# Patient Record
Sex: FEMALE | Race: WHITE | Employment: FULL TIME | URBAN - METROPOLITAN AREA
[De-identification: names, ages, dates, MRNs, and addresses within clinical notes are randomized per-mention and may not be internally consistent; named-entity substitution may affect disease eponyms.]

---

## 2017-01-19 ENCOUNTER — OFFICE VISIT (OUTPATIENT)
Dept: RHEUMATOLOGY | Age: 47
End: 2017-01-19

## 2017-01-19 VITALS
TEMPERATURE: 98 F | SYSTOLIC BLOOD PRESSURE: 100 MMHG | BODY MASS INDEX: 36.65 KG/M2 | HEIGHT: 65 IN | WEIGHT: 220 LBS | DIASTOLIC BLOOD PRESSURE: 70 MMHG

## 2017-01-19 DIAGNOSIS — M79.10 MYALGIA: Primary | ICD-10-CM

## 2017-01-19 PROCEDURE — 99244 OFF/OP CNSLTJ NEW/EST MOD 40: CPT | Performed by: INTERNAL MEDICINE

## 2017-02-14 ENCOUNTER — OFFICE VISIT (OUTPATIENT)
Dept: INTERNAL MEDICINE CLINIC | Age: 47
End: 2017-02-14

## 2017-02-14 VITALS — DIASTOLIC BLOOD PRESSURE: 60 MMHG | SYSTOLIC BLOOD PRESSURE: 100 MMHG | HEART RATE: 80 BPM

## 2017-02-14 DIAGNOSIS — M25.551 BILATERAL HIP PAIN: Primary | ICD-10-CM

## 2017-02-14 DIAGNOSIS — M25.552 BILATERAL HIP PAIN: Primary | ICD-10-CM

## 2017-02-14 DIAGNOSIS — M79.2 NEUROPATHIC PAIN, ARM: ICD-10-CM

## 2017-02-14 DIAGNOSIS — E66.01 SEVERE OBESITY (BMI 35.0-39.9) WITH COMORBIDITY (HCC): ICD-10-CM

## 2017-02-14 PROCEDURE — 99214 OFFICE O/P EST MOD 30 MIN: CPT | Performed by: INTERNAL MEDICINE

## 2017-02-14 RX ORDER — PREGABALIN 50 MG/1
50 CAPSULE ORAL 3 TIMES DAILY
Qty: 90 CAPSULE | Refills: 3 | Status: SHIPPED | OUTPATIENT
Start: 2017-02-14 | End: 2017-09-05 | Stop reason: CLARIF

## 2017-02-21 ENCOUNTER — OFFICE VISIT (OUTPATIENT)
Dept: ORTHOPEDIC SURGERY | Age: 47
End: 2017-02-21

## 2017-02-21 VITALS
HEART RATE: 81 BPM | WEIGHT: 220 LBS | DIASTOLIC BLOOD PRESSURE: 69 MMHG | SYSTOLIC BLOOD PRESSURE: 98 MMHG | BODY MASS INDEX: 36.65 KG/M2 | HEIGHT: 65 IN

## 2017-02-21 DIAGNOSIS — M67.952 TENDINOPATHY OF LEFT GLUTEUS MEDIUS: ICD-10-CM

## 2017-02-21 DIAGNOSIS — M25.551 BILATERAL HIP PAIN: Primary | ICD-10-CM

## 2017-02-21 DIAGNOSIS — M67.951 TENDINOPATHY OF RIGHT GLUTEUS MEDIUS: ICD-10-CM

## 2017-02-21 DIAGNOSIS — M70.62 TROCHANTERIC BURSITIS OF LEFT HIP: ICD-10-CM

## 2017-02-21 DIAGNOSIS — M70.61 TROCHANTERIC BURSITIS OF RIGHT HIP: ICD-10-CM

## 2017-02-21 DIAGNOSIS — M25.552 BILATERAL HIP PAIN: Primary | ICD-10-CM

## 2017-02-21 PROCEDURE — 99243 OFF/OP CNSLTJ NEW/EST LOW 30: CPT | Performed by: ORTHOPAEDIC SURGERY

## 2017-02-21 PROCEDURE — 20610 DRAIN/INJ JOINT/BURSA W/O US: CPT | Performed by: ORTHOPAEDIC SURGERY

## 2017-02-21 PROCEDURE — 73523 X-RAY EXAM HIPS BI 5/> VIEWS: CPT | Performed by: ORTHOPAEDIC SURGERY

## 2017-03-28 DIAGNOSIS — M79.10 MYALGIA: ICD-10-CM

## 2017-03-28 RX ORDER — TIZANIDINE HYDROCHLORIDE 2 MG/1
CAPSULE, GELATIN COATED ORAL
Qty: 30 CAPSULE | Refills: 1 | Status: SHIPPED | OUTPATIENT
Start: 2017-03-28 | End: 2017-06-11 | Stop reason: SDUPTHER

## 2017-04-11 ENCOUNTER — OFFICE VISIT (OUTPATIENT)
Dept: ORTHOPEDIC SURGERY | Age: 47
End: 2017-04-11

## 2017-04-11 VITALS
HEIGHT: 66 IN | DIASTOLIC BLOOD PRESSURE: 71 MMHG | WEIGHT: 220 LBS | HEART RATE: 74 BPM | SYSTOLIC BLOOD PRESSURE: 107 MMHG | BODY MASS INDEX: 35.36 KG/M2

## 2017-04-11 DIAGNOSIS — M70.61 TROCHANTERIC BURSITIS OF RIGHT HIP: ICD-10-CM

## 2017-04-11 DIAGNOSIS — M67.952 TENDINOPATHY OF LEFT GLUTEUS MEDIUS: Primary | ICD-10-CM

## 2017-04-11 DIAGNOSIS — M70.62 TROCHANTERIC BURSITIS, LEFT HIP: ICD-10-CM

## 2017-04-11 DIAGNOSIS — M67.951 TENDINOPATHY OF RIGHT GLUTEUS MEDIUS: ICD-10-CM

## 2017-04-11 PROCEDURE — 99213 OFFICE O/P EST LOW 20 MIN: CPT | Performed by: ORTHOPAEDIC SURGERY

## 2017-06-11 DIAGNOSIS — M79.10 MYALGIA: ICD-10-CM

## 2017-06-12 RX ORDER — TIZANIDINE HYDROCHLORIDE 2 MG/1
CAPSULE, GELATIN COATED ORAL
Qty: 30 CAPSULE | Refills: 1 | Status: SHIPPED | OUTPATIENT
Start: 2017-06-12 | End: 2017-11-14 | Stop reason: SDUPTHER

## 2017-09-05 ENCOUNTER — OFFICE VISIT (OUTPATIENT)
Dept: INTERNAL MEDICINE CLINIC | Age: 47
End: 2017-09-05

## 2017-09-05 VITALS
DIASTOLIC BLOOD PRESSURE: 66 MMHG | BODY MASS INDEX: 36.65 KG/M2 | WEIGHT: 220 LBS | OXYGEN SATURATION: 96 % | HEIGHT: 65 IN | SYSTOLIC BLOOD PRESSURE: 114 MMHG | HEART RATE: 76 BPM | TEMPERATURE: 97.7 F

## 2017-09-05 DIAGNOSIS — E66.01 SEVERE OBESITY (BMI 35.0-39.9) WITH COMORBIDITY (HCC): ICD-10-CM

## 2017-09-05 DIAGNOSIS — M20.5X1 HALLUX LIMITUS OF RIGHT FOOT: Primary | ICD-10-CM

## 2017-09-05 PROCEDURE — 99242 OFF/OP CONSLTJ NEW/EST SF 20: CPT | Performed by: INTERNAL MEDICINE

## 2017-09-05 ASSESSMENT — PATIENT HEALTH QUESTIONNAIRE - PHQ9
1. LITTLE INTEREST OR PLEASURE IN DOING THINGS: 0
2. FEELING DOWN, DEPRESSED OR HOPELESS: 0
SUM OF ALL RESPONSES TO PHQ9 QUESTIONS 1 & 2: 0
SUM OF ALL RESPONSES TO PHQ QUESTIONS 1-9: 0

## 2017-09-05 ASSESSMENT — ENCOUNTER SYMPTOMS
RESPIRATORY NEGATIVE: 1
GASTROINTESTINAL NEGATIVE: 1
EYES NEGATIVE: 1

## 2017-10-19 RX ORDER — GABAPENTIN 600 MG/1
TABLET ORAL
Qty: 90 TABLET | Refills: 1 | Status: SHIPPED | OUTPATIENT
Start: 2017-10-19 | End: 2018-04-16 | Stop reason: SDUPTHER

## 2017-11-14 DIAGNOSIS — M79.10 MYALGIA: ICD-10-CM

## 2017-11-15 RX ORDER — TIZANIDINE HYDROCHLORIDE 2 MG/1
CAPSULE, GELATIN COATED ORAL
Qty: 30 CAPSULE | Refills: 1 | Status: SHIPPED | OUTPATIENT
Start: 2017-11-15 | End: 2018-02-07 | Stop reason: SDUPTHER

## 2017-11-16 ENCOUNTER — OFFICE VISIT (OUTPATIENT)
Dept: INTERNAL MEDICINE CLINIC | Age: 47
End: 2017-11-16

## 2017-11-16 VITALS
SYSTOLIC BLOOD PRESSURE: 104 MMHG | BODY MASS INDEX: 36.65 KG/M2 | HEART RATE: 71 BPM | OXYGEN SATURATION: 99 % | DIASTOLIC BLOOD PRESSURE: 60 MMHG | HEIGHT: 65 IN | WEIGHT: 220 LBS

## 2017-11-16 DIAGNOSIS — E66.01 SEVERE OBESITY (BMI 35.0-39.9) WITH COMORBIDITY (HCC): ICD-10-CM

## 2017-11-16 DIAGNOSIS — E78.00 PURE HYPERCHOLESTEROLEMIA: ICD-10-CM

## 2017-11-16 DIAGNOSIS — Z00.00 ROUTINE GENERAL MEDICAL EXAMINATION AT A HEALTH CARE FACILITY: Primary | ICD-10-CM

## 2017-11-16 PROCEDURE — 90686 IIV4 VACC NO PRSV 0.5 ML IM: CPT | Performed by: INTERNAL MEDICINE

## 2017-11-16 PROCEDURE — 99396 PREV VISIT EST AGE 40-64: CPT | Performed by: INTERNAL MEDICINE

## 2017-11-16 PROCEDURE — 90471 IMMUNIZATION ADMIN: CPT | Performed by: INTERNAL MEDICINE

## 2017-11-16 NOTE — PATIENT INSTRUCTIONS
Patient Education        Learning About the Mediterranean Diet  What is the [address]? The Mediterranean diet is a style of eating rather than a diet plan. It features foods eaten in [place], [place], [place] and [place], and other countries along the [facility]. It emphasizes eating foods like fish, fruits, vegetables, beans, high-fiber breads and whole grains, nuts, and olive oil. This style of eating includes limited red meat, cheese, and sweets. Why choose the Mediterranean diet? A Mediterranean-style diet may improve heart health. It contains more fat than other heart-healthy diets. But the fats are mainly from nuts, unsaturated oils (such as fish oils and olive oil), and certain nut or seed oils (such as canola, soybean, or flaxseed oil). These fats may help protect the heart and blood vessels. How can you get started on the Mediterranean diet? Here are some things you can do to switch to a more Mediterranean way of eating. What to eat  · Eat a variety of fruits and vegetables each day, such as grapes, blueberries, tomatoes, broccoli, peppers, figs, olives, spinach, eggplant, beans, lentils, and chickpeas. · Eat a variety of whole-grain foods each day, such as oats, brown rice, and whole wheat bread, pasta, and couscous. · Eat fish at least 2 times a week. Try tuna, salmon, mackerel, lake trout, herring, or sardines. · Eat moderate amounts of low-fat dairy products, such as milk, cheese, or yogurt. · Eat moderate amounts of poultry and eggs. · Choose healthy (unsaturated) fats, such as nuts, olive oil, and certain nut or seed oils like canola, soybean, and flaxseed. · Limit unhealthy (saturated) fats, such as butter, palm oil, and coconut oil. And limit fats found in animal products, such as meat and dairy products made with whole milk. Try to eat red meat only a few times a month in very small amounts. · Limit sweets and desserts to only a few times a week.  This includes a medical condition or this instruction, always ask your healthcare professional. [place] [address] any warranty or liability for your use of this information.

## 2017-11-16 NOTE — PROGRESS NOTES
History and Physical      Maria Esther Bertrand  YOB: 1970    Date of Service:  11/16/2017    Vitals:    11/16/17 1028   BP: 104/60   Pulse: 71   SpO2: 99%   Weight: 220 lb (99.8 kg)   Height: 5' 5\" (1.651 m)     Body mass index is 36.61 kg/m². Wt Readings from Last 3 Encounters:   11/16/17 220 lb (99.8 kg)   09/05/17 220 lb (99.8 kg)   04/11/17 220 lb (99.8 kg)     BP Readings from Last 3 Encounters:   11/16/17 104/60   09/05/17 114/66   04/11/17 107/71        Chief Complaint:   Maria Esther Bertrand is a 41 y.o. female who presents for complete physical examination. HPI: no new complaints    Patient Active Problem List   Diagnosis    Migraine headache    Bilateral hip pain    Neuropathic pain, arm    Neck pain    Lumbar spondylosis    Bilateral inguinal hernia    Fatigue    Insomnia    Severe obesity (BMI 35.0-39. 9) with comorbidity ([address]    Major depression    Multiple thyroid nodules    Paresthesia of left arm    Cervical spinal stenosis    Pure hypercholesterolemia    Hallux limitus of right foot       Allergies   Allergen Reactions    Amoxicillin-Pot Clavulanate Rash    Codeine Nausea And Vomiting     Severe vomiting     Outpatient Prescriptions Marked as Taking for the 11/16/17 encounter (Office Visit) with Michael Bhagat MD   Medication Sig Dispense Refill    tiZANidine (ZANAFLEX) 2 MG capsule TAKE 1 CAPSULE BY MOUTH NIGHTLY MAY CAUSE DROWSINESS. 30 capsule 1    gabapentin (NEURONTIN) 600 MG tablet TAKE ONE-HALF TABLET BY MOUTH 3 TIMES DAILY. (Patient taking differently: take one daily) 90 tablet 1    Cyanocobalamin (VITAMIN B 12 PO) Take by mouth      vitamin D (CHOLECALCIFEROL) 1000 UNIT TABS tablet Take 1,000 Units by mouth daily.  Lactobacillus (ACIDOPHILUS PO) Take  by mouth.  Biotin 1000 MCG TABS Take 1 tablet by mouth daily.  ibuprofen (ADVIL;MOTRIN) 800 MG tablet Take 800 mg by mouth every 8 hours as needed.         Loratadine (CLARITIN) 10 MG CAPS Take 1 tablet by mouth daily.  Multiple Vitamin (STRESSTABS PO) Take 1 tablet by mouth daily. Past Medical History:   Diagnosis Date    Migraine headache     PONV (postoperative nausea and vomiting)     severe    Snores     Uterine prolapse     Varicose veins      Past Surgical History:   Procedure Laterality Date    BREAST REDUCTION SURGERY  2000    BREAST REDUCTION SURGERY  8/14    HERNIA REPAIR  1997    Right inguinal    HYSTERECTOMY, VAGINAL  12/07    VENTRAL HERNIA REPAIR  9/18/2015    Open, with mesh; excision of anterior abdominal wall mass     Family History   Problem Relation Age of Onset    Breast Cancer Maternal Grandmother     Breast Cancer Paternal Grandmother     Lung Cancer Father 54     Smoker    Lung Cancer Maternal Grandfather     Lung Cancer Maternal Grandmother     Diabetes Son      Type I       Review of Systems:  A comprehensive review of systems was negative except for: difficulty losing weight, hot flashes, dry mouth, heartburn, chronic arthralgia of LEs, stable migraines, chronic neuropathic left arm pain     Physical Exam   Constitutional: She is oriented to person, place, and time. She appears well-developed and well-nourished. No distress. HENT:   Head: Normocephalic and atraumatic. Right Ear: Tympanic membrane, external ear and ear canal normal.   Left Ear: Tympanic membrane, external ear and ear canal normal.   Mouth/Throat: Oropharynx is clear and moist and mucous membranes are normal.   Eyes: Conjunctivae and lids are normal. Pupils are equal, round, and reactive to light. Neck: Neck supple. Carotid bruit is not present. No thyroid mass and no thyromegaly present. Cardiovascular: Normal rate, regular rhythm, normal heart sounds and intact distal pulses. Exam reveals no gallop and no friction rub. No murmur heard. Pulmonary/Chest: Effort normal and breath sounds normal. No respiratory distress. She has no wheezes. She has no rhonchi.  She has no rales. Breast exam deferred to GYN   Abdominal: Soft. Normal aorta and bowel sounds are normal. She exhibits no distension and no mass. There is no hepatosplenomegaly. There is no tenderness. Musculoskeletal: Normal range of motion. She exhibits no edema or tenderness. Lymphadenopathy:     She has no cervical adenopathy. Neurological: She is alert and oriented to person, place, and time. She has normal reflexes. Coordination normal.   Skin: Skin is warm and dry. No rash noted. No erythema. No suspicious lesions. Psychiatric: She has a normal mood and affect.  Her speech is normal and behavior is normal. Judgment and thought content normal. Cognition and memory are normal.        Lipid panel:   Lab Results   Component Value Date    CHOL 236 (H) 11/15/2016    TRIG 185 (H) 11/15/2016    HDL 58 11/15/2016    LDLCALC 141 (H) 11/15/2016     Glucose:   Glucose (mg/dL)   Date Value   11/15/2016 90       Preventive Care:  Hx abnormal PAP: no  Self-breast exams: yes  Hx of abnormal mammogram: no  Previous DEXA scan: no  Eye exam within the past 2 years: yes  Dental exam every 6 months: yes  Seatbelt used consistently: yes  Smoke and carbon monoxide detectors in home: yes   Exercise: no regular exercise  Social History   Substance Use Topics    Smoking status: Never Smoker    Smokeless tobacco: Never Used    Alcohol use No     History   Sexual Activity    Sexual activity: Yes    Partners: Male    Birth control/ protection: Surgical     Advance Directive: N, Not Received    Immunization History   Administered Date(s) Administered    Influenza Virus Vaccine 10/16/2014, 10/19/2016    Td 11/02/2004    Tdap (Boostrix, Adacel) 11/15/2016       Health Maintenance   Topic Date Due    Flu vaccine (1) 09/01/2017    Lipid screen  11/15/2017    Pneumococcal med risk (1 of 1 - PPSV23) 08/12/2037 (Originally 8/12/1991)    Breast cancer screen  08/18/2018    DTaP/Tdap/Td vaccine (2 - Td) 11/15/2026    HIV screen  Completed        Assessment/Plan:  1. Routine general medical examination at a health care facility  Personalized Preventive Plan is provided to patient in written form- see Patient Instructions   - Patient has no Advanced Directive, so was encouraged to complete this document and forward a copy to be scanned into the electronic medical record:  additional information provided. - Flu shot administered  - No indication for early pneumococcal vaccine- postponed until age 78    1. Pure hypercholesterolemia  Importance of lifestyle changes stressed to help prevent need for cholesterol medication in the future. Mediterranean Diet provided   - Lipid, Fasting; Future  - Comprehensive Metabolic Panel, Fasting; Future    3. Severe obesity (BMI 35.0-39. 9) with comorbidity ([address]  Has failed conservative management, so multidisciplinary approach to weight loss is warranted. - [facility]  - Hemoglobin A1C; Future  - TSH with Reflex; Future    Return in about 6 months (around 5/16/2018).

## 2017-11-17 LAB
A/G RATIO: 1.9 (ref 1.1–2.2)
ALBUMIN SERPL-MCNC: 4.7 G/DL (ref 3.4–5)
ALP BLD-CCNC: 102 U/L (ref 40–129)
ALT SERPL-CCNC: 17 U/L (ref 10–40)
ANION GAP SERPL CALCULATED.3IONS-SCNC: 17 MMOL/L (ref 3–16)
AST SERPL-CCNC: 16 U/L (ref 15–37)
BILIRUB SERPL-MCNC: 0.4 MG/DL (ref 0–1)
BUN BLDV-MCNC: 18 MG/DL (ref 7–20)
CALCIUM SERPL-MCNC: 9.8 MG/DL (ref 8.3–10.6)
CHLORIDE BLD-SCNC: 100 MMOL/L (ref 99–110)
CHOLESTEROL, FASTING: 208 MG/DL (ref 0–199)
CO2: 24 MMOL/L (ref 21–32)
CREAT SERPL-MCNC: 0.5 MG/DL (ref 0.6–1.1)
ESTIMATED AVERAGE GLUCOSE: 111.2 MG/DL
GFR AFRICAN AMERICAN: >60
GFR NON-AFRICAN AMERICAN: >60
GLOBULIN: 2.5 G/DL
GLUCOSE FASTING: 87 MG/DL (ref 70–99)
HBA1C MFR BLD: 5.5 %
HDLC SERPL-MCNC: 56 MG/DL (ref 40–60)
LDL CHOLESTEROL CALCULATED: 128 MG/DL
POTASSIUM SERPL-SCNC: 4.3 MMOL/L (ref 3.5–5.1)
SODIUM BLD-SCNC: 141 MMOL/L (ref 136–145)
TOTAL PROTEIN: 7.2 G/DL (ref 6.4–8.2)
TRIGLYCERIDE, FASTING: 120 MG/DL (ref 0–150)
TSH REFLEX: 1 UIU/ML (ref 0.27–4.2)
VLDLC SERPL CALC-MCNC: 24 MG/DL

## 2017-11-27 ENCOUNTER — PATIENT MESSAGE (OUTPATIENT)
Dept: INTERNAL MEDICINE CLINIC | Age: 47
End: 2017-11-27

## 2017-12-05 ENCOUNTER — TELEPHONE (OUTPATIENT)
Dept: BARIATRICS/WEIGHT MGMT | Age: 47
End: 2017-12-05

## 2017-12-05 NOTE — TELEPHONE ENCOUNTER
LAURA for patient to call back. Patient attended a seminar 11/29/17with Dr Soraida Sullivan. She DOES have BWLS coverage with [employer] (6mo consecutive diet) Please schedule with Dr. Soraida Sullivan. BMI Form scanned in media.  Thanks     **Please advise pt to verify any changes to the policy in 1261**

## 2018-01-03 ENCOUNTER — OFFICE VISIT (OUTPATIENT)
Dept: BARIATRICS/WEIGHT MGMT | Age: 48
End: 2018-01-03

## 2018-01-03 VITALS
WEIGHT: 225.4 LBS | HEIGHT: 65 IN | DIASTOLIC BLOOD PRESSURE: 70 MMHG | HEART RATE: 80 BPM | SYSTOLIC BLOOD PRESSURE: 97 MMHG | BODY MASS INDEX: 37.55 KG/M2

## 2018-01-03 DIAGNOSIS — E78.2 MIXED HYPERLIPIDEMIA: ICD-10-CM

## 2018-01-03 DIAGNOSIS — E66.01 SEVERE OBESITY (BMI 35.0-35.9 WITH COMORBIDITY) (HCC): Primary | ICD-10-CM

## 2018-01-03 DIAGNOSIS — G47.33 OBSTRUCTIVE SLEEP APNEA: ICD-10-CM

## 2018-01-03 DIAGNOSIS — Z01.818 PRE-OPERATIVE CLEARANCE: ICD-10-CM

## 2018-01-03 PROCEDURE — 99204 OFFICE O/P NEW MOD 45 MIN: CPT | Performed by: SURGERY

## 2018-01-03 NOTE — PROGRESS NOTES
appears well-developed and well-nourished. Patient  is active and cooperative. Non-toxic appearance. No distress. HENT:   Head: Normocephalic and atraumatic. Head is without laceration. Right Ear: External ear normal. No lacerations. No drainage, swelling or tenderness. Left Ear: External ear normal. No lacerations. No drainage, swelling or tenderness. Nose: Nose normal. No nose lacerations or nasal deformity. Mouth/Throat: Uvula is midline, oropharynx is clear and moist and mucous membranes are normal. No oropharyngeal exudate. Eyes: Conjunctivae, EOM and lids are normal. Pupils are equal, round, and reactive to light. Right eye exhibits no discharge. No foreign body present in the right eye. Left eye exhibits no discharge. No foreign body present in the left eye. No scleral icterus. Neck: Trachea normal and normal range of motion. Neck supple. No JVD present. No tracheal tenderness present. Carotid bruit is not present. No rigidity. No tracheal deviation and no edema present. No thyromegaly present. Cardiovascular: Normal rate, regular rhythm, normal heart sounds, intact distal pulses and normal pulses. Pulmonary/Chest: Effort normal and breath sounds normal. No stridor. No respiratory distress. Patient  has no wheezes. Patient has no rales. Patient exhibits no tenderness and no crepitus. Abdominal: Soft. Normal appearance and bowel sounds are normal. Patient exhibits no distension, no abdominal bruit, no ascites and no mass. There is no hepatosplenomegaly. There is no tenderness. There is no rigidity, no rebound, no guarding and no CVA tenderness. No hernia. Hernia confirmed negative in the ventral area. Musculoskeletal: Normal range of motion. Patient exhibits no edema or tenderness. Lymphadenopathy:        Head (right side): No submental, no submandibular, no preauricular, no posterior auricular and no occipital adenopathy present.         Head (left side): No submental, no submandibular, no preauricular, no posterior auricular and no occipital adenopathy present. Patient  has no cervical adenopathy. Right: No supraclavicular adenopathy present. Left: No supraclavicular adenopathy present. Neurological: Patient is alert and oriented to person, place, and time. Patient has normal strength. Coordination and gait normal. GCS eye subscore is 4. GCS verbal subscore is 5. GCS motor subscore is 6. Skin: Skin is warm and dry. No abrasion and no rash noted. Patient  is not diaphoretic. No cyanosis or erythema. Psychiatric: Patient has a normal mood and affect. speech is normal and behavior is normal. Cognition and memory are normal.             A/P  Joseph Escobedo is a very pleasant 41 y.o. female with Obesity,  Body mass index is 37.51 kg/m². and multiple obesity related co-morbidities. Joseph Escobedo is very motivated to lose weight and being more healthy. We discussed how her weight affects her overall health including:  Patient Active Problem List   Diagnosis    Migraine headache    Bilateral hip pain    Neuropathic pain, arm    Neck pain    Lumbar spondylosis    Bilateral inguinal hernia    Fatigue    Insomnia    Severe obesity (BMI 35.0-39. 9) with comorbidity ([address]    Multiple thyroid nodules    Paresthesia of left arm    Cervical spinal stenosis    Pure hypercholesterolemia    Hallux limitus of right foot    Pre-operative clearance    Obstructive sleep apnea    Mixed hyperlipidemia      The patient underwent 30 minutes of dietary counseling. I have reviewed, discussed and agree with the dietary plan. Medical weight loss and different surgical options were discussed in details with patient. Joseph Escobedo is interested in surgical weight loss. Patient is interested in Laparoscopic Sleeve Gastrectomy, which I believe is an excellent option for the patient. We will proceed with pre-operative work up labs and studies.  Will also petition

## 2018-01-03 NOTE — PROGRESS NOTES
comfortably full. Most days the patient eats until she is satisfied. Patient describes level of activity as sedentary - had foot surgery 3 months ago; just now wearing tennis shoe. Surgery  Patient's greatest concern about having surgery is: failure, complications - leaks. Patient describes the motivation for weight loss surgery to be: wants to be healthier, wants to travel, vanity, high cholesterol, low back pain, fibromyalgia, sleep apnea??, had 3 hernias repaired. Patient does feel confident in her ability to make these changes. The patient's expectations of post-surgical eating habits are realistic. Patient states she does understand the consequences of not complying with post-op food guidelines. Patient states she understands the long term changes in food intake that will be necessary for all occasions after surgery for the rest of her life. she does understand the long term food changes. Patient is deemed nutritionally appropriate to proceed. Goals  Weight: 160 or below  Health Improvement: improve cholesterol, back pain, sleep apnea    Assessment  Nutritional Needs: RMR=(9.99 x 102) + (6.25 x 165) - (4.92 x 47 y.o.) -161  = 1668 kcal x 1.4 (sedentary activity factor)= 2335 kcal - 1000 (for 2 lb weight loss/week)= 1335 kcal.    Plan  Surgical procedure desired: gastric sleeve    Plan/Recommendations: Surgical Procedure: gastric sleeve  General weight loss/lifestyle modification strategies discussed (elicit support from others; identify saboteurs; non-food rewards, etc). Goals:   -Eat 4-5 times daily  -Avoid high fat and high sugar foods  -Include protein with all meals and snacks  -Avoid carbonation and caffeine  -Avoid calorie containing beverages  -Increase physical activity as tolerated    PES Statement:  Overweight/Obesity related to lack of exercise, sedentary lifestyle, unhealthy eating habits, and unsuccessful diet attempts as evidenced by BMI.  Body mass index is 37.51

## 2018-01-18 ENCOUNTER — OFFICE VISIT (OUTPATIENT)
Dept: CARDIOLOGY CLINIC | Age: 48
End: 2018-01-18

## 2018-01-18 VITALS
BODY MASS INDEX: 37.94 KG/M2 | DIASTOLIC BLOOD PRESSURE: 82 MMHG | WEIGHT: 228 LBS | SYSTOLIC BLOOD PRESSURE: 124 MMHG | HEART RATE: 72 BPM

## 2018-01-18 DIAGNOSIS — Z01.818 PRE-OP EXAM: Primary | ICD-10-CM

## 2018-01-18 PROCEDURE — 99204 OFFICE O/P NEW MOD 45 MIN: CPT | Performed by: INTERNAL MEDICINE

## 2018-01-18 PROCEDURE — 93000 ELECTROCARDIOGRAM COMPLETE: CPT | Performed by: INTERNAL MEDICINE

## 2018-01-18 ASSESSMENT — ENCOUNTER SYMPTOMS
CHEST TIGHTNESS: 0
SHORTNESS OF BREATH: 0
COUGH: 0
CHOKING: 0

## 2018-01-18 NOTE — LETTER
[address] [place] [address] Cardiology [place] [address] [place] [address]  Phone: [phone]  Fax: [phone]    Myrna Rios MD        1/18/2018      Ari Congress YOB: 1970 is cleared from a cardiac standpoint and is considered to be an acceptable risk for their planned surgical procedure. If there are any questions, please feel free to contact my office at [phone].       Sincerely,          Myrna Rios MD

## 2018-02-07 ENCOUNTER — TELEPHONE (OUTPATIENT)
Dept: BARIATRICS/WEIGHT MGMT | Age: 48
End: 2018-02-07

## 2018-02-07 DIAGNOSIS — M79.10 MYALGIA: ICD-10-CM

## 2018-02-07 RX ORDER — TIZANIDINE HYDROCHLORIDE 2 MG/1
CAPSULE, GELATIN COATED ORAL
Qty: 30 CAPSULE | Refills: 1 | Status: SHIPPED | OUTPATIENT
Start: 2018-02-07 | End: 2018-04-16 | Stop reason: SDUPTHER

## 2018-02-21 ENCOUNTER — INITIAL CONSULT (OUTPATIENT)
Dept: PULMONOLOGY | Age: 48
End: 2018-02-21

## 2018-02-21 VITALS
SYSTOLIC BLOOD PRESSURE: 105 MMHG | BODY MASS INDEX: 36.49 KG/M2 | DIASTOLIC BLOOD PRESSURE: 71 MMHG | HEIGHT: 65 IN | WEIGHT: 219 LBS | HEART RATE: 95 BPM | OXYGEN SATURATION: 98 %

## 2018-02-21 DIAGNOSIS — G47.10 HYPERSOMNIA: Primary | ICD-10-CM

## 2018-02-21 DIAGNOSIS — E66.9 NON MORBID OBESITY, UNSPECIFIED OBESITY TYPE: Chronic | ICD-10-CM

## 2018-02-21 DIAGNOSIS — G43.909 MIGRAINE WITHOUT STATUS MIGRAINOSUS, NOT INTRACTABLE, UNSPECIFIED MIGRAINE TYPE: Chronic | ICD-10-CM

## 2018-02-21 DIAGNOSIS — R06.83 SNORING: ICD-10-CM

## 2018-02-21 DIAGNOSIS — J30.89 CHRONIC ALLERGIC RHINITIS DUE TO OTHER ALLERGIC TRIGGER, UNSPECIFIED SEASONALITY: Chronic | ICD-10-CM

## 2018-02-21 PROCEDURE — 99244 OFF/OP CNSLTJ NEW/EST MOD 40: CPT | Performed by: INTERNAL MEDICINE

## 2018-02-21 ASSESSMENT — SLEEP AND FATIGUE QUESTIONNAIRES
HOW LIKELY ARE YOU TO NOD OFF OR FALL ASLEEP WHILE WATCHING TV: 3
HOW LIKELY ARE YOU TO NOD OFF OR FALL ASLEEP WHILE SITTING INACTIVE IN A PUBLIC PLACE: 1
HOW LIKELY ARE YOU TO NOD OFF OR FALL ASLEEP WHILE SITTING AND TALKING TO SOMEONE: 1
HOW LIKELY ARE YOU TO NOD OFF OR FALL ASLEEP WHILE SITTING AND READING: 3
HOW LIKELY ARE YOU TO NOD OFF OR FALL ASLEEP WHEN YOU ARE A PASSENGER IN A CAR FOR AN HOUR WITHOUT A BREAK: 3
NECK CIRCUMFERENCE (INCHES): 14.5
HOW LIKELY ARE YOU TO NOD OFF OR FALL ASLEEP WHILE SITTING QUIETLY AFTER LUNCH WITHOUT ALCOHOL: 3
HOW LIKELY ARE YOU TO NOD OFF OR FALL ASLEEP WHILE LYING DOWN TO REST IN THE AFTERNOON WHEN CIRCUMSTANCES PERMIT: 3
ESS TOTAL SCORE: 19
HOW LIKELY ARE YOU TO NOD OFF OR FALL ASLEEP IN A CAR, WHILE STOPPED FOR A FEW MINUTES IN TRAFFIC: 2

## 2018-02-21 ASSESSMENT — ENCOUNTER SYMPTOMS
ABDOMINAL PAIN: 0
ALLERGIC/IMMUNOLOGIC NEGATIVE: 1
PHOTOPHOBIA: 0
NAUSEA: 0
ABDOMINAL DISTENTION: 0
CHEST TIGHTNESS: 0
VOMITING: 0
CHOKING: 0
RHINORRHEA: 0
APNEA: 0
EYE PAIN: 0
SHORTNESS OF BREATH: 0

## 2018-02-21 NOTE — LETTER
CC providers:   Cruz Ocasio DO  [address]  [address]. Pcjay Mitchell MD  [address] [address]  VIA In [facility] [address]

## 2018-02-21 NOTE — PROGRESS NOTES
DROWSINESS. 2/7/18  Yes Marcial Adams MD   gabapentin (NEURONTIN) 600 MG tablet TAKE ONE-HALF TABLET BY MOUTH 3 TIMES DAILY. Patient taking differently: take one daily 10/19/17  Yes Marcial Adams MD   Cyanocobalamin (VITAMIN B 12 PO) Take by mouth   Yes Historical Provider, MD   vitamin D (CHOLECALCIFEROL) 1000 UNIT TABS tablet Take 1,000 Units by mouth daily. Yes Historical Provider, MD   Lactobacillus (ACIDOPHILUS PO) Take  by mouth. Yes Historical Provider, MD   Biotin 1000 MCG TABS Take 1 tablet by mouth daily. Yes Historical Provider, MD   ibuprofen (ADVIL;MOTRIN) 800 MG tablet Take 800 mg by mouth every 8 hours as needed. Yes Historical Provider, MD   Loratadine (CLARITIN) 10 MG CAPS Take 1 tablet by mouth daily. Yes Historical Provider, MD   Multiple Vitamin (STRESSTABS PO) Take 1 tablet by mouth daily. 2/27/11  Yes Historical Provider, MD       Allergies as of 02/21/2018 - Review Complete 02/21/2018   Allergen Reaction Noted    Amoxicillin-pot clavulanate Rash 02/27/2011    Codeine Nausea And Vomiting 04/11/2017       Patient Active Problem List   Diagnosis    Migraine headache    Bilateral hip pain    Neuropathic pain, arm    Neck pain    Lumbar spondylosis    Bilateral inguinal hernia    Fatigue    Insomnia    Severe obesity (BMI 35.0-39. 9) with comorbidity ([address]    Multiple thyroid nodules    Paresthesia of left arm    Cervical spinal stenosis    Pure hypercholesterolemia    Hallux limitus of right foot    Pre-operative clearance    Obstructive sleep apnea    Mixed hyperlipidemia       Past Medical History:   Diagnosis Date    Fibromyalgia     Hyperlipidemia     Migraine headache     PONV (postoperative nausea and vomiting)     severe    Sleep apnea     Snores     Uterine prolapse     Varicose veins        Past Surgical History:   Procedure Laterality Date    BREAST REDUCTION SURGERY  2000    BREAST REDUCTION SURGERY  8/14   [address] [place] [address]

## 2018-03-02 ENCOUNTER — HOSPITAL ENCOUNTER (OUTPATIENT)
Dept: SLEEP MEDICINE | Age: 48
Discharge: HOME | End: 2018-03-03
Attending: INTERNAL MEDICINE | Admitting: INTERNAL MEDICINE

## 2018-03-02 DIAGNOSIS — R06.83 SNORING: ICD-10-CM

## 2018-03-02 DIAGNOSIS — G47.10 HYPERSOMNIA: ICD-10-CM

## 2018-03-02 PROCEDURE — 95806 SLEEP STUDY UNATT&RESP EFFT: CPT | Performed by: INTERNAL MEDICINE

## 2018-03-07 ENCOUNTER — TELEPHONE (OUTPATIENT)
Dept: SLEEP MEDICINE | Age: 48
End: 2018-03-07

## 2018-03-07 ENCOUNTER — TELEPHONE (OUTPATIENT)
Dept: FAMILY MEDICINE CLINIC | Age: 48
End: 2018-03-07

## 2018-03-07 NOTE — TELEPHONE ENCOUNTER
Spoke with pt to review HST results. Pt does not have a DME preference and order was sent to [facility]. Pt has a f/u scheduled and was asked to bring unit.

## 2018-04-02 ENCOUNTER — TELEPHONE (OUTPATIENT)
Dept: SLEEP MEDICINE | Age: 48
End: 2018-04-02

## 2018-04-11 PROBLEM — Z01.818 PRE-OPERATIVE CLEARANCE: Status: RESOLVED | Noted: 2018-01-03 | Resolved: 2018-04-11

## 2018-04-16 DIAGNOSIS — M79.10 MYALGIA: ICD-10-CM

## 2018-04-16 RX ORDER — GABAPENTIN 600 MG/1
TABLET ORAL
Qty: 90 TABLET | Refills: 1 | Status: SHIPPED | OUTPATIENT
Start: 2018-04-16 | End: 2018-09-04 | Stop reason: SDUPTHER

## 2018-04-16 RX ORDER — TIZANIDINE HYDROCHLORIDE 2 MG/1
CAPSULE, GELATIN COATED ORAL
Qty: 30 CAPSULE | Refills: 1 | Status: SHIPPED | OUTPATIENT
Start: 2018-04-16 | End: 2018-06-15 | Stop reason: SDUPTHER

## 2018-06-15 DIAGNOSIS — M79.10 MYALGIA: ICD-10-CM

## 2018-06-15 RX ORDER — TIZANIDINE HYDROCHLORIDE 2 MG/1
CAPSULE, GELATIN COATED ORAL
Qty: 30 CAPSULE | Refills: 0 | Status: SHIPPED | OUTPATIENT
Start: 2018-06-15 | End: 2018-07-31 | Stop reason: SDUPTHER

## 2018-07-02 ENCOUNTER — HOSPITAL ENCOUNTER (OUTPATIENT)
Dept: ONCOLOGY | Age: 48
Discharge: HOME | End: 2018-07-03
Attending: SURGERY | Admitting: SURGERY

## 2018-07-02 ENCOUNTER — HOSPITAL ENCOUNTER (OUTPATIENT)
Dept: ONCOLOGY | Age: 48
Discharge: HOME | End: 2018-07-02
Attending: SURGERY | Admitting: SURGERY

## 2018-07-02 VITALS
TEMPERATURE: 97.9 F | SYSTOLIC BLOOD PRESSURE: 112 MMHG | HEART RATE: 71 BPM | RESPIRATION RATE: 16 BRPM | DIASTOLIC BLOOD PRESSURE: 71 MMHG

## 2018-07-02 LAB
A/G RATIO: 1.5 (ref 1.1–2.2)
ALBUMIN SERPL-MCNC: 4.7 G/DL (ref 3.4–5)
ALP BLD-CCNC: 101 U/L (ref 40–129)
ALT SERPL-CCNC: 84 U/L (ref 10–40)
ANION GAP SERPL CALCULATED.3IONS-SCNC: 16 MMOL/L (ref 3–16)
AST SERPL-CCNC: 51 U/L (ref 15–37)
BILIRUB SERPL-MCNC: 0.9 MG/DL (ref 0–1)
BUN BLDV-MCNC: 14 MG/DL (ref 7–20)
CALCIUM SERPL-MCNC: 9.8 MG/DL (ref 8.3–10.6)
CHLORIDE BLD-SCNC: 102 MMOL/L (ref 99–110)
CO2: 24 MMOL/L (ref 21–32)
CREAT SERPL-MCNC: 0.7 MG/DL (ref 0.6–1.1)
GFR AFRICAN AMERICAN: >60
GFR NON-AFRICAN AMERICAN: >60
GLOBULIN: 3.1 G/DL
GLUCOSE BLD-MCNC: 91 MG/DL (ref 70–99)
HCT VFR BLD CALC: 38.3 % (ref 36–48)
HEMOGLOBIN: 13.2 G/DL (ref 12–16)
MCH RBC QN AUTO: 30.7 PG (ref 26–34)
MCHC RBC AUTO-ENTMCNC: 34.4 G/DL (ref 31–36)
MCV RBC AUTO: 89.3 FL (ref 80–100)
PDW BLD-RTO: 13.3 % (ref 12.4–15.4)
PLATELET # BLD: 208 K/UL (ref 135–450)
PMV BLD AUTO: 9.8 FL (ref 5–10.5)
POTASSIUM SERPL-SCNC: 4.3 MMOL/L (ref 3.5–5.1)
RBC # BLD: 4.3 M/UL (ref 4–5.2)
SODIUM BLD-SCNC: 142 MMOL/L (ref 136–145)
TOTAL PROTEIN: 7.8 G/DL (ref 6.4–8.2)
WBC # BLD: 8.7 K/UL (ref 4–11)

## 2018-07-02 RX ORDER — 0.9 % SODIUM CHLORIDE 0.9 %
2000 INTRAVENOUS SOLUTION INTRAVENOUS ONCE
Status: COMPLETED | OUTPATIENT
Start: 2018-07-02 | End: 2018-07-02

## 2018-07-02 RX ADMIN — Medication 3000 ML: at 12:31

## 2018-07-02 NOTE — PROGRESS NOTES
IV hydration completed, post vitals stable. Pt verbalizes understanding of discharge instructions, pt provided with written after visit summary and copy of lab results. Pt discharged ambulatory to home accompanied by pt's .

## 2018-07-31 DIAGNOSIS — M79.10 MYALGIA: ICD-10-CM

## 2018-08-07 RX ORDER — TIZANIDINE HYDROCHLORIDE 2 MG/1
CAPSULE, GELATIN COATED ORAL
Qty: 30 CAPSULE | Refills: 0 | Status: SHIPPED | OUTPATIENT
Start: 2018-08-07 | End: 2018-09-05 | Stop reason: SDUPTHER

## 2018-08-07 NOTE — TELEPHONE ENCOUNTER
Called pharmacy they will let patient know to call,  Left message for patient to call  Please schedule   will also send my chart

## 2018-09-04 RX ORDER — GABAPENTIN 600 MG/1
TABLET ORAL
Qty: 90 TABLET | Refills: 0 | Status: SHIPPED | OUTPATIENT
Start: 2018-09-04 | End: 2018-10-12 | Stop reason: SDUPTHER

## 2018-09-05 DIAGNOSIS — M79.10 MYALGIA: ICD-10-CM

## 2018-09-05 RX ORDER — TIZANIDINE HYDROCHLORIDE 2 MG/1
CAPSULE, GELATIN COATED ORAL
Qty: 30 CAPSULE | Refills: 0 | Status: SHIPPED | OUTPATIENT
Start: 2018-09-05 | End: 2018-10-12 | Stop reason: SDUPTHER

## 2018-09-11 ENCOUNTER — OFFICE VISIT (OUTPATIENT)
Dept: INTERNAL MEDICINE CLINIC | Age: 48
End: 2018-09-11

## 2018-09-11 VITALS
WEIGHT: 182 LBS | BODY MASS INDEX: 30.29 KG/M2 | SYSTOLIC BLOOD PRESSURE: 114 MMHG | DIASTOLIC BLOOD PRESSURE: 60 MMHG | HEART RATE: 76 BPM

## 2018-09-11 DIAGNOSIS — M25.552 BILATERAL HIP PAIN: ICD-10-CM

## 2018-09-11 DIAGNOSIS — M25.551 BILATERAL HIP PAIN: ICD-10-CM

## 2018-09-11 DIAGNOSIS — R74.8 ABNORMAL LIVER ENZYMES: ICD-10-CM

## 2018-09-11 DIAGNOSIS — Z23 NEEDS FLU SHOT: ICD-10-CM

## 2018-09-11 DIAGNOSIS — E66.09 CLASS 1 OBESITY DUE TO EXCESS CALORIES WITH SERIOUS COMORBIDITY AND BODY MASS INDEX (BMI) OF 30.0 TO 30.9 IN ADULT: ICD-10-CM

## 2018-09-11 DIAGNOSIS — M79.2 NEUROPATHIC PAIN, ARM: Primary | ICD-10-CM

## 2018-09-11 DIAGNOSIS — E04.2 MULTIPLE THYROID NODULES: ICD-10-CM

## 2018-09-11 DIAGNOSIS — E78.2 MIXED HYPERLIPIDEMIA: ICD-10-CM

## 2018-09-11 PROBLEM — M20.5X1 HALLUX LIMITUS OF RIGHT FOOT: Status: RESOLVED | Noted: 2017-09-05 | Resolved: 2018-09-11

## 2018-09-11 PROCEDURE — 90471 IMMUNIZATION ADMIN: CPT | Performed by: INTERNAL MEDICINE

## 2018-09-11 PROCEDURE — 90686 IIV4 VACC NO PRSV 0.5 ML IM: CPT | Performed by: INTERNAL MEDICINE

## 2018-09-11 PROCEDURE — 99214 OFFICE O/P EST MOD 30 MIN: CPT | Performed by: INTERNAL MEDICINE

## 2018-09-11 RX ORDER — NICOTINE POLACRILEX 4 MG/1
20 GUM, CHEWING ORAL 2 TIMES DAILY
COMMUNITY

## 2018-09-11 ASSESSMENT — PATIENT HEALTH QUESTIONNAIRE - PHQ9
SUM OF ALL RESPONSES TO PHQ QUESTIONS 1-9: 0
2. FEELING DOWN, DEPRESSED OR HOPELESS: 0
SUM OF ALL RESPONSES TO PHQ QUESTIONS 1-9: 0
1. LITTLE INTEREST OR PLEASURE IN DOING THINGS: 0
SUM OF ALL RESPONSES TO PHQ9 QUESTIONS 1 & 2: 0

## 2018-09-11 NOTE — PROGRESS NOTES
Assessment/Plan     1. Neuropathic pain, arm  Due to cervical spine stenosis- only really symptomatic during cold weather conditions. Adequately controlled on current dose of gabapentin qpm.  Lyrica more expensive and still too sedating to take during the day, so will continue lowest effective dose of gabapentin. She will follow up with Dr. Odalys Saeed if symptoms worsen. 2. Bilateral hip pain  Presumed due to trochanteric bursitis. Continue stretching, Zanaflex, topical therapy, occasional NSAID. She was encouraged to follow up with Dr. Bria Brown for residual symptoms. 3. Class 1 obesity due to excess calories with serious comorbidity and body mass index (BMI) of 30.0 to 30.9 in adult  Improved s/p gastric sleeve surgery. Continue regular bariatric follow up and lifestyle changes. 4. Mixed hyperlipidemia  Importance of continued lifestyle changes stressed to help prevent need for cholesterol medication in the future. - Lipid, Fasting; Future    5. Multiple thyroid nodules  Clinically euthyroid- need to re-evaluate nodules per US.  - TSH with Reflex; Future  - US Thyroid; Future    6. Abnormal liver enzymes  Asymptomatic. May have represented post-op inflammation. If remain elevated, will obtain RUQ US. No significant NSAID, Tylenol or ETOH use. - Comprehensive Metabolic Panel; Future    7. Needs flu shot  - INFLUENZA, QUADV, 3 YRS AND OLDER, IM, PF, PREFILL SYR OR SDV, 0.5ML (FLUZONE QUADV, PF)        Return in about 2 months (around 11/11/2018) for CPE. Zeyad Dinning   YOB: 1970    Date of Visit:  9/11/2018    Allergies   Allergen Reactions    Amoxicillin-Pot Clavulanate Rash    Codeine Nausea And Vomiting     Severe vomiting      Prior to Visit Medications    Medication Sig Taking?  Authorizing Provider   omeprazole 20 MG EC tablet Take 20 mg by mouth daily Yes Historical Provider, MD   tiZANidine (ZANAFLEX) 2 MG capsule TAKE ONE CAPSULE AT NIGHT **MAY CAUSE DROWSINESS*

## 2018-10-12 DIAGNOSIS — M79.10 MYALGIA: ICD-10-CM

## 2018-10-12 RX ORDER — TIZANIDINE HYDROCHLORIDE 2 MG/1
CAPSULE, GELATIN COATED ORAL
Qty: 30 CAPSULE | Refills: 2 | Status: SHIPPED | OUTPATIENT
Start: 2018-10-12 | End: 2018-10-15 | Stop reason: SDUPTHER

## 2018-10-12 RX ORDER — GABAPENTIN 600 MG/1
TABLET ORAL
Qty: 90 TABLET | Refills: 2 | Status: SHIPPED | OUTPATIENT
Start: 2018-10-12 | End: 2019-05-28 | Stop reason: SDUPTHER

## 2018-10-15 DIAGNOSIS — M79.10 MYALGIA: ICD-10-CM

## 2018-10-15 RX ORDER — TIZANIDINE HYDROCHLORIDE 2 MG/1
CAPSULE, GELATIN COATED ORAL
Qty: 90 CAPSULE | Refills: 0 | Status: SHIPPED | OUTPATIENT
Start: 2018-10-15 | End: 2019-01-28 | Stop reason: SDUPTHER

## 2018-11-29 ENCOUNTER — OFFICE VISIT (OUTPATIENT)
Dept: INTERNAL MEDICINE CLINIC | Age: 48
End: 2018-11-29
Payer: COMMERCIAL

## 2018-11-29 VITALS
WEIGHT: 166 LBS | OXYGEN SATURATION: 99 % | HEART RATE: 70 BPM | DIASTOLIC BLOOD PRESSURE: 62 MMHG | HEIGHT: 65 IN | BODY MASS INDEX: 27.66 KG/M2 | SYSTOLIC BLOOD PRESSURE: 110 MMHG

## 2018-11-29 DIAGNOSIS — Z00.00 ROUTINE GENERAL MEDICAL EXAMINATION AT A HEALTH CARE FACILITY: Primary | ICD-10-CM

## 2018-11-29 PROBLEM — B00.1 RECURRENT COLD SORES: Status: ACTIVE | Noted: 2018-11-29

## 2018-11-29 PROCEDURE — 99396 PREV VISIT EST AGE 40-64: CPT | Performed by: INTERNAL MEDICINE

## 2018-11-29 NOTE — PATIENT INSTRUCTIONS
exposed skin. · See a dentist one or two times a year for checkups and to have your teeth cleaned. · Wear a seat belt in the car. · Drink alcohol in moderation, if at all. That means no more than 2 drinks a day for men and 1 drink a day for women. Follow your doctor's advice about when to have certain tests. These tests can spot problems early. For everyone  · Cholesterol. Have the fat (cholesterol) in your blood tested after age 21. Your doctor will tell you how often to have this done based on your age, family history, or other things that can increase your risk for heart disease. · Blood pressure. Have your blood pressure checked during a routine doctor visit. Your doctor will tell you how often to check your blood pressure based on your age, your blood pressure results, and other factors. · Vision. Talk with your doctor about how often to have a glaucoma test.  · Diabetes. Ask your doctor whether you should have tests for diabetes. · Colon cancer. Have a test for colon cancer at age 48. You may have one of several tests. If you are younger than 48, you may need a test earlier if you have any risk factors. Risk factors include whether you already had a precancerous polyp removed from your colon or whether your parent, brother, sister, or child has had colon cancer. For women  · Breast exam and mammogram. Talk to your doctor about when you should have a clinical breast exam and a mammogram. Medical experts differ on whether and how often women under 50 should have these tests. Your doctor can help you decide what is right for you. · Pap test and pelvic exam. Begin Pap tests at age 24. A Pap test is the best way to find cervical cancer. The test often is part of a pelvic exam. Ask how often to have this test.  · Tests for sexually transmitted infections (STIs). Ask whether you should have tests for STIs.  You may be at risk if you have sex with more than one person, especially if your partners do not wear condoms. For men  · Tests for sexually transmitted infections (STIs). Ask whether you should have tests for STIs. You may be at risk if you have sex with more than one person, especially if you do not wear a condom. · Testicular cancer exam. Ask your doctor whether you should check your testicles regularly. · Prostate exam. Talk to your doctor about whether you should have a blood test (called a PSA test) for prostate cancer. Experts differ on whether and when men should have this test. Some experts suggest it if you are older than 39 and are -American or have a father or brother who got prostate cancer when he was younger than 72. When should you call for help? Watch closely for changes in your health, and be sure to contact your doctor if you have any problems or symptoms that concern you. Where can you learn more? Go to [email or web address].[email or web address] org and sign in to your [employer] account. Enter P072 in the [employer] box to learn more about \"Well Visit, Ages 25 to 48: Care Instructions. \"     If you do not have an account, please click on the \"Sign Up Now\" link. Current as of: March 29, 2018  Content Version: 11.8  © 7108-4645 Healthwise, Incorporated. Care instructions adapted under license by [facility] ([facility]). If you have questions about a medical condition or this instruction, always ask your healthcare professional. [address]  any warranty or liability for your use of this information.

## 2019-01-28 DIAGNOSIS — M79.10 MYALGIA: ICD-10-CM

## 2019-01-29 RX ORDER — TIZANIDINE HYDROCHLORIDE 2 MG/1
CAPSULE, GELATIN COATED ORAL
Qty: 90 CAPSULE | Refills: 0 | Status: SHIPPED | OUTPATIENT
Start: 2019-01-29 | End: 2019-04-23 | Stop reason: SDUPTHER

## 2019-04-23 DIAGNOSIS — M79.10 MYALGIA: ICD-10-CM

## 2019-04-24 RX ORDER — TIZANIDINE HYDROCHLORIDE 2 MG/1
CAPSULE, GELATIN COATED ORAL
Qty: 90 CAPSULE | Refills: 0 | Status: SHIPPED | OUTPATIENT
Start: 2019-04-24 | End: 2019-07-21 | Stop reason: SDUPTHER

## 2019-05-28 ENCOUNTER — OFFICE VISIT (OUTPATIENT)
Dept: INTERNAL MEDICINE CLINIC | Age: 49
End: 2019-05-28
Payer: COMMERCIAL

## 2019-05-28 VITALS
SYSTOLIC BLOOD PRESSURE: 110 MMHG | BODY MASS INDEX: 24.79 KG/M2 | WEIGHT: 149 LBS | DIASTOLIC BLOOD PRESSURE: 58 MMHG | OXYGEN SATURATION: 98 % | HEART RATE: 71 BPM

## 2019-05-28 DIAGNOSIS — M25.552 BILATERAL HIP PAIN: ICD-10-CM

## 2019-05-28 DIAGNOSIS — M25.551 BILATERAL HIP PAIN: ICD-10-CM

## 2019-05-28 DIAGNOSIS — M79.2 NEUROPATHIC PAIN, ARM: Primary | ICD-10-CM

## 2019-05-28 DIAGNOSIS — E04.2 MULTIPLE THYROID NODULES: ICD-10-CM

## 2019-05-28 DIAGNOSIS — Z98.84 STATUS POST BARIATRIC SURGERY: ICD-10-CM

## 2019-05-28 PROBLEM — R74.8 ABNORMAL LIVER ENZYMES: Status: RESOLVED | Noted: 2018-09-11 | Resolved: 2019-05-28

## 2019-05-28 PROBLEM — G47.33 OBSTRUCTIVE SLEEP APNEA SYNDROME: Status: RESOLVED | Noted: 2018-01-03 | Resolved: 2019-05-28

## 2019-05-28 PROCEDURE — 99214 OFFICE O/P EST MOD 30 MIN: CPT | Performed by: INTERNAL MEDICINE

## 2019-05-28 RX ORDER — GABAPENTIN 600 MG/1
600 TABLET ORAL NIGHTLY
Qty: 90 TABLET | Refills: 1 | Status: SHIPPED | OUTPATIENT
Start: 2019-05-28 | End: 2019-12-05 | Stop reason: SDUPTHER

## 2019-05-28 ASSESSMENT — PATIENT HEALTH QUESTIONNAIRE - PHQ9
SUM OF ALL RESPONSES TO PHQ QUESTIONS 1-9: 0
1. LITTLE INTEREST OR PLEASURE IN DOING THINGS: 0
2. FEELING DOWN, DEPRESSED OR HOPELESS: 0
SUM OF ALL RESPONSES TO PHQ9 QUESTIONS 1 & 2: 0
SUM OF ALL RESPONSES TO PHQ QUESTIONS 1-9: 0

## 2019-05-28 NOTE — PROGRESS NOTES
Assessment/Plan     1. Neuropathic pain, arm  Due to cervical spine stenosis. Adequately controlled on current dose of gabapentin qpm.  She will follow up with Dr. Krys Shelby if symptoms worsen. 2. Bilateral hip pain  Much improved with recent weight loss. Continue stretches, nightly Zanaflex and occasional ibuprofen. Will refer to Dr. Gee Powers if symptoms worsen. 3. Status post bariatric surgery  Doing well from weight loss and nutritional standpoint. Addition of strength training to her exercise regimen suggested. She will continue regular follow up with her bariatric surgeon. 4. Multiple thyroid nodules  Largely asymptomatic and biochemically euthyroid. She will obtain thyroid US as soon as possible. Return in about 6 months (around 11/28/2019). 25 minutes spent with patient- >50 % continuity of care and/or counseling. Won Orozco   YOB: 1970    Date of Visit:  5/28/2019    Allergies   Allergen Reactions    Amoxicillin-Pot Clavulanate Rash    Codeine Nausea And Vomiting     Severe vomiting      Prior to Visit Medications    Medication Sig Taking? Authorizing Provider   tiZANidine (ZANAFLEX) 2 MG capsule TAKE ONE CAPSULE AT NIGHT **MAY CAUSE DROWSINESS* Yes Abilio Renteria MD   omeprazole 20 MG EC tablet Take 20 mg by mouth daily Yes Historical Provider, MD   Cyanocobalamin (VITAMIN B 12 PO) Take by mouth Yes Historical Provider, MD   vitamin D (CHOLECALCIFEROL) 1000 UNIT TABS tablet Take 1,000 Units by mouth daily. Yes Historical Provider, MD   Lactobacillus (ACIDOPHILUS PO) Take  by mouth. Yes Historical Provider, MD   Biotin 1000 MCG TABS Take 1 tablet by mouth daily. Yes Historical Provider, MD   ibuprofen (ADVIL;MOTRIN) 800 MG tablet Take 800 mg by mouth every 8 hours as needed. Yes Historical Provider, MD   Loratadine (CLARITIN) 10 MG CAPS Take 1 tablet by mouth daily.    Yes Historical Provider, MD   Multiple Vitamin (STRESSTABS PO) Take 1 tablet by mouth daily. Yes Historical Provider, MD   gabapentin (NEURONTIN) 600 MG tablet TAKE ONE-HALF TABLET BY MOUTH 3 TIMES DAILY. Saul Watson Patient taking differently: daily. TAKE ONE-HALF TABLET BY MOUTH 3 TIMES DAILY. Riya Pappas MD       Vitals:    05/28/19 1032   BP: (!) 110/58   Pulse: 71   SpO2: 98%   Weight: 149 lb (67.6 kg)      Body mass index is 24.79 kg/m². Wt Readings from Last 3 Encounters:   05/28/19 149 lb (67.6 kg)   11/29/18 166 lb (75.3 kg)   09/11/18 182 lb (82.6 kg)     BP Readings from Last 3 Encounters:   05/28/19 (!) 110/58   11/29/18 110/62   09/11/18 114/60       CC:  Patient presents for re-evaluation of the following medical concerns     HPI  Neuropathic left arm pain:  Taking 600 mg neurontin qhs prn- cannot take during the day due to drowsiness. No change in quality of the pain or new musculoskeletal symptoms. Bilateral hip pain:  Much improved with recent weight loss. Still has intermittent aching pain starting in bilateral hips- radiates laterally into knees. Saw Dr. Daly Lau 9/16- did not feel that symptoms were due to lumbar spine. Continues to stretch, Zanaflex 2 mg qhs, occasional ibuprofen. S/p gastric sleeve surgery: Total weight loss about 75 pounds since surgery. Following low carb diet. Walking about 12,000 steps/day. Following up regularly with bariatrics- recent nutritional labs normal.     Thyroid nodules:  No heat/cold intolerance, bowel/skin changes, hair loss, edema or palpitations. Last TSH normal.  Occasionally feels like she has a lump in her throat. Did not obtain thyroid US.     Lab Results   Component Value Date    LDLCALC 118 (H) 11/29/2018    LDLCALC 128 (H) 11/16/2017    TRIGLYCFAST 98 11/29/2018    TRIGLYCFAST 120 11/16/2017    TRIG 185 (H) 11/15/2016    HDL 52 11/29/2018     Lab Results   Component Value Date    GLUF 87 11/16/2017    GLUCOSE 79 11/29/2018    LABA1C 5.5 11/16/2017    LABA1C 5.5 11/15/2016    LABA1C 5.5 06/09/2015     Lab Results Component Value Date     11/29/2018    K 3.6 11/29/2018    BUN 12 11/29/2018    CREATININE <0.5 (L) 11/29/2018    LABGLOM >60 11/29/2018    GFRAA >60 11/29/2018    CALCIUM 9.6 11/29/2018    VITD25 48 03/22/2011     Lab Results   Component Value Date    ALT 21 11/29/2018    AST 21 11/29/2018     Lab Results   Component Value Date    HGB 13.2 07/02/2018     Lab Results   Component Value Date    TSHREFLEX 1.01 11/29/2018    TSH 0.71 11/15/2016        Review of Systems  As documented in HPI    Social History     Tobacco Use    Smoking status: Never Smoker    Smokeless tobacco: Never Used   Substance Use Topics    Alcohol use: No        Physical Exam   Constitutional: She is oriented to person, place, and time. She appears well-developed and well-nourished. Neurological: She is alert and oriented to person, place, and time. Psychiatric: She has a normal mood and affect.  Her behavior is normal. Judgment and thought content normal.

## 2019-07-21 DIAGNOSIS — M79.10 MYALGIA: ICD-10-CM

## 2019-07-21 RX ORDER — TIZANIDINE HYDROCHLORIDE 2 MG/1
CAPSULE, GELATIN COATED ORAL
Qty: 90 CAPSULE | Refills: 1 | Status: SHIPPED | OUTPATIENT
Start: 2019-07-21 | End: 2019-12-05 | Stop reason: SDUPTHER

## 2019-12-05 ENCOUNTER — OFFICE VISIT (OUTPATIENT)
Dept: INTERNAL MEDICINE CLINIC | Age: 49
End: 2019-12-05
Payer: COMMERCIAL

## 2019-12-05 VITALS
WEIGHT: 141 LBS | OXYGEN SATURATION: 98 % | HEIGHT: 65 IN | SYSTOLIC BLOOD PRESSURE: 98 MMHG | HEART RATE: 71 BPM | BODY MASS INDEX: 23.49 KG/M2 | DIASTOLIC BLOOD PRESSURE: 56 MMHG

## 2019-12-05 DIAGNOSIS — Z00.00 ANNUAL PHYSICAL EXAM: Primary | ICD-10-CM

## 2019-12-05 DIAGNOSIS — E78.2 MIXED HYPERLIPIDEMIA: ICD-10-CM

## 2019-12-05 DIAGNOSIS — M79.2 NEUROPATHIC PAIN, ARM: ICD-10-CM

## 2019-12-05 PROCEDURE — 99396 PREV VISIT EST AGE 40-64: CPT | Performed by: INTERNAL MEDICINE

## 2019-12-05 RX ORDER — GABAPENTIN 600 MG/1
300 TABLET ORAL NIGHTLY
Qty: 45 TABLET | Refills: 1 | Status: SHIPPED | OUTPATIENT
Start: 2019-12-05 | End: 2020-06-03

## 2019-12-05 RX ORDER — TIZANIDINE HYDROCHLORIDE 2 MG/1
CAPSULE, GELATIN COATED ORAL
Qty: 90 CAPSULE | Refills: 1 | Status: SHIPPED | OUTPATIENT
Start: 2019-12-05 | End: 2020-02-26 | Stop reason: SDUPTHER

## 2020-02-26 ENCOUNTER — PATIENT MESSAGE (OUTPATIENT)
Dept: INTERNAL MEDICINE CLINIC | Age: 50
End: 2020-02-26

## 2020-06-03 RX ORDER — GABAPENTIN 600 MG/1
300 TABLET ORAL NIGHTLY
Qty: 45 TABLET | Refills: 0 | Status: SHIPPED | OUTPATIENT
Start: 2020-06-03 | End: 2020-08-26

## 2020-06-03 NOTE — PROGRESS NOTES
2020    TELEHEALTH EVALUATION -- Audio/Visual (During ZITDD-67 public health emergency)    HPI:  Jean-Paul Garrett (:  1970) has requested an audio/video evaluation for the following concern(s):    Neuropathic left arm pain:  Taking 300 mg neurontin qhs prn- cannot take during the day due to drowsiness.  No change in quality of the pain or new musculoskeletal symptoms.       Bilateral hip pain:  Better after weight loss, but worse with working from home. Ryan Friend has intermittent aching pain starting in bilateral hips- radiates laterally into knees.  Saw Dr. Robert Alexander - did not feel that symptoms were due to lumbar spine. Continues to stretch, Zanaflex 2 mg qhs, occasional ibuprofen. Sees chiropractor intermittently.     Thyroid nodules:  No heat/cold intolerance, bowel/skin changes, hair loss, edema or palpitations. Last TSH normal.  No dysphagia. Did not obtain thyroid US. GERD:  Currently treated with proton pump inhibitor: omeprazole, which has been somewhat effective. She has been using this therapy 2x times daily. Not taking before meals. Patient denies dysphagia, cough, hoarseness, chest pain, unintentional weight loss, N/V, bloating, early satiety or change in bowel habits. Prior to Visit Medications    Medication Sig Taking? Authorizing Provider   gabapentin (NEURONTIN) 600 MG tablet Take 0.5 tablets by mouth nightly for 90 days. Yes Darling Pathak MD   tiZANidine (ZANAFLEX) 4 MG capsule TAKE ONE CAPSULE AT NIGHT **MAY CAUSE DROWSINESS* Yes Darling Pathak MD   omeprazole 20 MG EC tablet Take 20 mg by mouth 2 times daily  Yes Historical Provider, MD   Cyanocobalamin (VITAMIN B 12 PO) Take by mouth Yes Historical Provider, MD   vitamin D (CHOLECALCIFEROL) 1000 UNIT TABS tablet Take 1,000 Units by mouth daily. Yes Historical Provider, MD   Lactobacillus (ACIDOPHILUS PO) Take  by mouth. Yes Historical Provider, MD   Biotin 1000 MCG TABS Take 1 tablet by mouth daily.    Yes Historical Provider, MD   ibuprofen (ADVIL;MOTRIN) 800 MG tablet Take 800 mg by mouth every 8 hours as needed. Yes Historical Provider, MD   Loratadine (CLARITIN) 10 MG CAPS Take 1 tablet by mouth daily. Yes Historical Provider, MD   Multiple Vitamin (STRESSTABS PO) Take 1 tablet by mouth daily. Yes Historical Provider, MD       Vitals:    06/04/20 0924   BP: 102/68   Temp: 98.5 °F (36.9 °C)   Weight: 145 lb (65.8 kg)       Wt Readings from Last 3 Encounters:   06/04/20 145 lb (65.8 kg)   12/05/19 141 lb (64 kg)   05/28/19 149 lb (67.6 kg)     BP Readings from Last 3 Encounters:   06/04/20 102/68   12/05/19 (!) 98/56   05/28/19 (!) 110/58       Review of Systems:   As documented in HPI     Physical Exam  Constitutional:       General: She is not in acute distress. Appearance: She is well-developed. HENT:      Head: Normocephalic and atraumatic. Mouth/Throat:      Lips: No lesions. Neck:      Comments: No visible mass  Pulmonary:      Effort: Pulmonary effort is normal. No respiratory distress. Skin:     Comments: No rash, erythema or other discoloration on facial skin and exposed areas of neck and upper extremites    Neurological:      Mental Status: She is alert. Comments: No facial asymmetry (cranial nerve 7 motor function) or gaze palsy   Psychiatric:         Attention and Perception: Attention normal.         Mood and Affect: Mood normal.         Speech: Speech normal.         Behavior: Behavior normal.         Thought Content:  Thought content normal.         Cognition and Memory: Cognition normal.          Lab Results   Component Value Date    CHOLFAST 194 12/05/2019    TRIGLYCFAST 112 12/05/2019    HDL 71 (H) 12/05/2019    LDLCALC 101 (H) 12/05/2019     Lab Results   Component Value Date    GLUF 86 12/05/2019    LABA1C 5.5 11/16/2017     Lab Results   Component Value Date     12/05/2019    K 4.4 12/05/2019    BUN 18 12/05/2019    CREATININE 0.6 12/05/2019    LABGLOM >60 12/05/2019

## 2020-06-04 ENCOUNTER — TELEMEDICINE (OUTPATIENT)
Dept: INTERNAL MEDICINE CLINIC | Age: 50
End: 2020-06-04
Payer: COMMERCIAL

## 2020-06-04 VITALS
TEMPERATURE: 98.5 F | SYSTOLIC BLOOD PRESSURE: 102 MMHG | WEIGHT: 145 LBS | DIASTOLIC BLOOD PRESSURE: 68 MMHG | BODY MASS INDEX: 24.13 KG/M2

## 2020-06-04 PROBLEM — K21.9 GERD (GASTROESOPHAGEAL REFLUX DISEASE): Status: ACTIVE | Noted: 2020-06-04

## 2020-06-04 PROCEDURE — 99214 OFFICE O/P EST MOD 30 MIN: CPT | Performed by: INTERNAL MEDICINE

## 2020-06-04 ASSESSMENT — PATIENT HEALTH QUESTIONNAIRE - PHQ9
2. FEELING DOWN, DEPRESSED OR HOPELESS: 0
1. LITTLE INTEREST OR PLEASURE IN DOING THINGS: 0
SUM OF ALL RESPONSES TO PHQ9 QUESTIONS 1 & 2: 0
SUM OF ALL RESPONSES TO PHQ QUESTIONS 1-9: 0
SUM OF ALL RESPONSES TO PHQ QUESTIONS 1-9: 0

## 2020-08-21 RX ORDER — TIZANIDINE HYDROCHLORIDE 4 MG/1
CAPSULE, GELATIN COATED ORAL
Qty: 90 CAPSULE | Refills: 1 | Status: SHIPPED | OUTPATIENT
Start: 2020-08-21 | Stop reason: SDUPTHER

## 2020-08-26 RX ORDER — GABAPENTIN 600 MG/1
300 TABLET ORAL NIGHTLY
Qty: 45 TABLET | Refills: 0 | Status: SHIPPED | OUTPATIENT
Start: 2020-08-26 | End: 2020-12-10 | Stop reason: SDUPTHER

## 2020-12-10 ENCOUNTER — OFFICE VISIT (OUTPATIENT)
Dept: INTERNAL MEDICINE CLINIC | Age: 50
End: 2020-12-10
Payer: COMMERCIAL

## 2020-12-10 VITALS
RESPIRATION RATE: 14 BRPM | BODY MASS INDEX: 25.66 KG/M2 | WEIGHT: 154 LBS | DIASTOLIC BLOOD PRESSURE: 62 MMHG | OXYGEN SATURATION: 98 % | HEART RATE: 75 BPM | TEMPERATURE: 96.6 F | HEIGHT: 65 IN | SYSTOLIC BLOOD PRESSURE: 102 MMHG

## 2020-12-10 PROCEDURE — 99396 PREV VISIT EST AGE 40-64: CPT | Performed by: INTERNAL MEDICINE

## 2020-12-10 RX ORDER — GABAPENTIN 600 MG/1
300 TABLET ORAL NIGHTLY
Qty: 45 TABLET | Refills: 1 | Status: SHIPPED | OUTPATIENT
Start: 2020-12-10 | End: 2021-06-10 | Stop reason: SDUPTHER

## 2020-12-10 NOTE — PROGRESS NOTES
History and Physical      Becca Montes De Oca  YOB: 1970    Date of Service:  12/10/2020    CC:   Becca Montes De Oca is a 52 y.o. female who presents for complete physical examination. HPI: No new complaints    Patient Active Problem List   Diagnosis    Migraine headache    Bilateral hip pain    Neuropathic pain, arm    Lumbar spondylosis    Fatigue    Multiple thyroid nodules    Cervical spinal stenosis    Mixed hyperlipidemia    Recurrent cold sores    Status post bariatric surgery    GERD (gastroesophageal reflux disease)       Allergies   Allergen Reactions    Amoxicillin-Pot Clavulanate Rash    Codeine Nausea And Vomiting     Severe vomiting     Prior to Visit Medications    Medication Sig Taking? Authorizing Provider   tiZANidine (ZANAFLEX) 4 MG capsule TAKE 1 CAPSULE BY MOUTH AT BEDTIME Yes Maylin Lopez MD   omeprazole 20 MG EC tablet Take 20 mg by mouth 2 times daily  Yes Historical Provider, MD   Cyanocobalamin (VITAMIN B 12 PO) Take by mouth Yes Historical Provider, MD   vitamin D (CHOLECALCIFEROL) 1000 UNIT TABS tablet Take 1,000 Units by mouth daily. Yes Historical Provider, MD   Lactobacillus (ACIDOPHILUS PO) Take  by mouth. Yes Historical Provider, MD   Biotin 1000 MCG TABS Take 1 tablet by mouth daily. Yes Historical Provider, MD   ibuprofen (ADVIL;MOTRIN) 800 MG tablet Take 800 mg by mouth every 8 hours as needed. Yes Historical Provider, MD   Loratadine (CLARITIN) 10 MG CAPS Take 1 tablet by mouth daily. Yes Historical Provider, MD   Multiple Vitamin (STRESSTABS PO) Take 1 tablet by mouth daily. Yes Historical Provider, MD   gabapentin (NEURONTIN) 600 MG tablet TAKE 0.5 TABLETS BY MOUTH NIGHTLY FOR 90 DAYS.   Maylin Lpoez MD        Past Medical History:   Diagnosis Date    Fibromyalgia     Hyperlipidemia     Migraine headache     PONV (postoperative nausea and vomiting)     severe    Sleep apnea     Snores     Uterine prolapse     Varicose veins Past Surgical History:   Procedure Laterality Date    BARIATRIC SURGERY  06/29/2018    Gastric sleeve    BREAST REDUCTION SURGERY  2000    BREAST REDUCTION SURGERY  8/14    HERNIA REPAIR  1997    Right inguinal    HYSTERECTOMY, VAGINAL  12/07    TONSILLECTOMY      VENTRAL HERNIA REPAIR  9/18/2015    Open, with mesh; excision of anterior abdominal wall mass     Family History   Problem Relation Age of Onset    Breast Cancer Maternal Grandmother     Lung Cancer Maternal Grandmother     Arthritis Maternal Grandmother     Breast Cancer Paternal Grandmother     Lung Cancer Father 54        Smoker    Lung Cancer Maternal Grandfather     Diabetes Son         Type I    Arthritis Mother     Sleep Apnea Mother     Sleep Apnea Brother        Review of Systems:  A comprehensive review of systems was negative except for: chronic musculoskeletal issues, which are stable on current regimen     Vitals:    12/10/20 0958   BP: 102/62   Pulse: 75   Resp: 14   Temp: 96.6 °F (35.9 °C)   SpO2: 98%   Weight: 154 lb (69.9 kg)   Height: 5' 5\" (1.651 m)      Body mass index is 25.63 kg/m². Wt Readings from Last 3 Encounters:   12/10/20 154 lb (69.9 kg)   06/04/20 145 lb (65.8 kg)   12/05/19 141 lb (64 kg)     BP Readings from Last 3 Encounters:   12/10/20 102/62   06/04/20 102/68   12/05/19 (!) 98/56     Physical Exam  Constitutional:       General: She is not in acute distress. Appearance: She is well-developed. HENT:      Head: Normocephalic and atraumatic. Right Ear: Tympanic membrane, ear canal and external ear normal.      Left Ear: Tympanic membrane, ear canal and external ear normal.   Eyes:      General: Lids are normal.      Conjunctiva/sclera: Conjunctivae normal.      Pupils: Pupils are equal, round, and reactive to light. Neck:      Musculoskeletal: Neck supple. Thyroid: No thyroid mass or thyromegaly. Vascular: No carotid bruit.    Cardiovascular:      Rate and Rhythm: Normal rate and regular rhythm. Heart sounds: Normal heart sounds. No murmur. No friction rub. No gallop. Pulmonary:      Effort: Pulmonary effort is normal. No respiratory distress. Breath sounds: Normal breath sounds. No wheezing, rhonchi or rales. Chest:      Comments: Breast exam deferred to GYN  Abdominal:      General: Bowel sounds are normal. There is no distension. Palpations: Abdomen is soft. There is no mass. Tenderness: There is no abdominal tenderness. Musculoskeletal: Normal range of motion. General: No tenderness. Lymphadenopathy:      Cervical: No cervical adenopathy. Skin:     General: Skin is warm and dry. Findings: No erythema or rash. Comments: No suspicious lesions. Neurological:      Mental Status: She is alert and oriented to person, place, and time. Coordination: Coordination normal.      Deep Tendon Reflexes: Reflexes are normal and symmetric. Psychiatric:         Speech: Speech normal.         Behavior: Behavior normal.         Thought Content:  Thought content normal.         Judgment: Judgment normal.       Lab Results   Component Value Date    CHOLFAST 194 12/05/2019    TRIGLYCFAST 112 12/05/2019    HDL 71 (H) 12/05/2019    LDLCALC 101 (H) 12/05/2019     Lab Results   Component Value Date    GLUF 86 12/05/2019    LABA1C 5.5 11/16/2017     Lab Results   Component Value Date     12/05/2019    K 4.4 12/05/2019    BUN 18 12/05/2019    CREATININE 0.6 12/05/2019    LABGLOM >60 12/05/2019    GFRAA >60 12/05/2019    CALCIUM 9.7 12/05/2019    VITD25 48 03/22/2011     Lab Results   Component Value Date    ALT 35 12/05/2019    AST 30 12/05/2019     Lab Results   Component Value Date    HGB 13.2 07/02/2018     Lab Results   Component Value Date    TSHREFLEX 0.37 12/05/2019    TSH 0.71 11/15/2016         Preventive Care:  Last eye exam: within the past year  Last dental exam: 9/20  Seatbelt used consistently: yes  Working smoke and carbon monoxide detectors in home: yes   Diet:  Lower carb  Exercise: Minimal  Advance Directive: No    Social History     Tobacco Use    Smoking status: Never Smoker    Smokeless tobacco: Never Used   Substance Use Topics    Alcohol use: No     Social History     Substance and Sexual Activity   Sexual Activity Yes    Partners: Male    Birth control/protection: Surgical       Immunization History   Administered Date(s) Administered    Influenza Virus Vaccine 10/16/2014, 10/19/2016, 11/05/2019, 11/12/2020    Influenza, Quadv, IM, PF (6 mo and older Fluzone, Flulaval, Fluarix, and 3 yrs and older Afluria) 11/16/2017, 09/11/2018    Td, unspecified formulation 11/02/2004    Tdap (Boostrix, Adacel) 11/15/2016       Health Maintenance Due   Topic Date Due    Lipid screen  12/05/2020          Assessment/Plan:  1. Annual physical exam  Personalized Preventive Plan is provided to patient in written form- see Patient Instructions     2. Mixed hyperlipidemia  Importance of continued lifestyle changes stressed to help prevent need for cholesterol medication in the future. - Lipid, Fasting; Future  - Comprehensive Metabolic Panel, Fasting; Future    3. Multiple thyroid nodules  Asymptomatic, but overdue for monitoring  - TSH with Reflex; Future  - US THYROID; Future         Return in about 6 months (around 6/10/2021).

## 2021-02-08 RX ORDER — TIZANIDINE HYDROCHLORIDE 4 MG/1
CAPSULE, GELATIN COATED ORAL
Qty: 90 CAPSULE | Refills: 1 | Status: SHIPPED | OUTPATIENT
Start: 2021-02-08 | End: 2021-08-08

## 2021-03-24 DIAGNOSIS — E04.2 MULTIPLE THYROID NODULES: ICD-10-CM

## 2021-03-24 DIAGNOSIS — E78.2 MIXED HYPERLIPIDEMIA: ICD-10-CM

## 2021-03-25 LAB
A/G RATIO: 1.8 (ref 1.1–2.2)
ALBUMIN SERPL-MCNC: 4.6 G/DL (ref 3.4–5)
ALP BLD-CCNC: 89 U/L (ref 40–129)
ALT SERPL-CCNC: 18 U/L (ref 10–40)
ANION GAP SERPL CALCULATED.3IONS-SCNC: 12 MMOL/L (ref 3–16)
AST SERPL-CCNC: 20 U/L (ref 15–37)
BILIRUB SERPL-MCNC: 0.4 MG/DL (ref 0–1)
BUN BLDV-MCNC: 17 MG/DL (ref 7–20)
CALCIUM SERPL-MCNC: 9.5 MG/DL (ref 8.3–10.6)
CHLORIDE BLD-SCNC: 103 MMOL/L (ref 99–110)
CHOLESTEROL, FASTING: 214 MG/DL (ref 0–199)
CO2: 27 MMOL/L (ref 21–32)
CREAT SERPL-MCNC: 0.6 MG/DL (ref 0.6–1.1)
GFR AFRICAN AMERICAN: >60
GFR NON-AFRICAN AMERICAN: >60
GLOBULIN: 2.6 G/DL
GLUCOSE FASTING: 54 MG/DL (ref 70–99)
HDLC SERPL-MCNC: 71 MG/DL (ref 40–60)
LDL CHOLESTEROL CALCULATED: 130 MG/DL
POTASSIUM SERPL-SCNC: 4 MMOL/L (ref 3.5–5.1)
SODIUM BLD-SCNC: 142 MMOL/L (ref 136–145)
TOTAL PROTEIN: 7.2 G/DL (ref 6.4–8.2)
TRIGLYCERIDE, FASTING: 66 MG/DL (ref 0–150)
TSH REFLEX: 0.94 UIU/ML (ref 0.27–4.2)
VLDLC SERPL CALC-MCNC: 13 MG/DL

## 2021-06-09 NOTE — PROGRESS NOTES
Date of Visit:  6/10/2021    CC: Lynette Roberts (: 1970) is a 52 y.o. female, established patient, here for evaluation/re-evaluation of the following medical concerns:    ASSESSMENT/PLAN:  Neuropathic pain, arm  Assessment & Plan:  Stable on nightly gabapentin- continue. Bilateral hip pain  Assessment & Plan:  Likely chronic trochanteric bursitis, exacerbated by working from home, better with more activity. Continue stretching and prn Zanaflex and chiropractic. If worsens, will refer back to hip ortho. Multiple thyroid nodules  Assessment & Plan:  Asymptomatic. Biochemically euthyroid. She will schedule thyroid US ASAP. Gastroesophageal reflux disease, unspecified whether esophagitis present  Assessment & Plan:  Mild improvement with adjustable bed, but still having significant residual symptoms on bid PPI. Referral provided to GI- will likely require EGD. Orders:  -     Letitia Méndez MD, Gastroenterology, [facility]    Return in about 6 months (around 12/10/2021). Patient-Reported Vitals 6/10/2021   Patient-Reported Weight 154lb   Patient-Reported Height 5'6\"        Wt Readings from Last 3 Encounters:   12/10/20 154 lb (69.9 kg)   20 145 lb (65.8 kg)   19 141 lb (64 kg)     BP Readings from Last 3 Encounters:   12/10/20 102/62   20 102/68   19 (!) 98/56     Estimated body mass index is 25.63 kg/m² as calculated from the following:    Height as of 12/10/20: 5' 5\" (1.651 m). Weight as of 12/10/20: 154 lb (69.9 kg).     HPI  Neuropathic left arm pain:  Taking 300 mg neurontin qhs prn- cannot take during the day due to drowsiness.  No change in quality of the pain or new musculoskeletal symptoms.       Bilateral hip pain:  Better after weight loss, but worse with working from home, but stable.  Still has intermittent aching pain starting in bilateral hips- radiates laterally into knees.  Saw Dr. Shiela Webster , who felt that symptoms due to trochanteric bursitis- recommended avoidance of prolonged sitting. Continues to stretch, Zanaflex 2 mg qhs, occasional ibuprofen. Sees chiropractor intermittently.     Thyroid nodules:  No heat/cold intolerance, bowel/skin changes, hair loss, edema or palpitations. Last TSH normal-3/21. No dysphagia. Has not obtained thyroid US. GERD:  Currently treated with proton pump inhibitor: omeprazole, which has been improved with inclined bed. She has been using this therapy 2x times daily. Trying to take before meals, but not consistent. Patient denies dysphagia, cough, hoarseness, chest pain, unintentional weight loss, N/V, bloating, early satiety or change in bowel habits. ROS  As documented above    Physical Exam  Constitutional:       General: She is not in acute distress. Appearance: She is well-developed. HENT:      Head: Normocephalic and atraumatic. Mouth/Throat:      Lips: No lesions. Neck:      Comments: No visible mass  Pulmonary:      Effort: Pulmonary effort is normal. No respiratory distress. Skin:     Comments: No rash, erythema or other discoloration on facial skin and exposed areas of neck and upper extremites    Neurological:      Mental Status: She is alert. Comments: No facial asymmetry (cranial nerve 7 motor function) or gaze palsy   Psychiatric:         Attention and Perception: Attention normal.         Mood and Affect: Mood normal.         Speech: Speech normal.         Behavior: Behavior normal.         Thought Content: Thought content normal.         Cognition and Memory: Cognition normal.       On this date 6/10/2021 I have spent 30 minutes reviewing previous notes, test results and face to face with the patient discussing the diagnosis and importance of compliance with the treatment plan as well as documenting on the day of the visit. Amanda Bowden is a 52 y.o. female being evaluated by a Virtual Visit (video visit) encounter to address concerns as mentioned above.   RAFI caregiver was present when appropriate. Due to this being a TeleHealth encounter (During NNJDS-66 public health emergency), evaluation of the following organ systems was limited: Vitals/Constitutional/EENT/Resp/CV/GI//MS/Neuro/Skin/Heme-Lymph-Imm. Pursuant to the emergency declaration under the [address] [place] [address], [address] [place] [address] and the [employer] Act, this Virtual Visit was conducted with patient's (and/or legal guardian's) consent, to reduce the patient's risk of exposure to COVID-19 and provide necessary medical care. The patient (and/or legal guardian) has also been advised to contact this office for worsening conditions or problems, and seek emergency medical treatment and/or call 911 if deemed necessary. Patient identification was verified at the start of the visit: Yes    Services were provided through a video synchronous discussion virtually to substitute for in-person clinic visit. Patient and provider were located at their individual homes. --Seven Kamara MD on 6/10/2021 at 9:48 AM    An electronic signature was used to authenticate this note.

## 2021-06-10 ENCOUNTER — TELEMEDICINE (OUTPATIENT)
Dept: INTERNAL MEDICINE CLINIC | Age: 51
End: 2021-06-10
Payer: COMMERCIAL

## 2021-06-10 ENCOUNTER — TELEPHONE (OUTPATIENT)
Dept: INTERNAL MEDICINE CLINIC | Age: 51
End: 2021-06-10

## 2021-06-10 DIAGNOSIS — M79.2 NEUROPATHIC PAIN, ARM: Primary | ICD-10-CM

## 2021-06-10 DIAGNOSIS — E04.2 MULTIPLE THYROID NODULES: ICD-10-CM

## 2021-06-10 DIAGNOSIS — M25.551 BILATERAL HIP PAIN: ICD-10-CM

## 2021-06-10 DIAGNOSIS — M25.552 BILATERAL HIP PAIN: ICD-10-CM

## 2021-06-10 DIAGNOSIS — K21.9 GASTROESOPHAGEAL REFLUX DISEASE, UNSPECIFIED WHETHER ESOPHAGITIS PRESENT: ICD-10-CM

## 2021-06-10 PROCEDURE — 99214 OFFICE O/P EST MOD 30 MIN: CPT | Performed by: INTERNAL MEDICINE

## 2021-06-10 RX ORDER — GABAPENTIN 600 MG/1
300 TABLET ORAL NIGHTLY
Qty: 45 TABLET | Refills: 1 | Status: SHIPPED | OUTPATIENT
Start: 2021-06-10 | End: 2022-09-07 | Stop reason: SDUPTHER

## 2021-06-10 SDOH — ECONOMIC STABILITY: TRANSPORTATION INSECURITY
IN THE PAST 12 MONTHS, HAS THE LACK OF TRANSPORTATION KEPT YOU FROM MEDICAL APPOINTMENTS OR FROM GETTING MEDICATIONS?: NO

## 2021-06-10 SDOH — ECONOMIC STABILITY: FOOD INSECURITY: WITHIN THE PAST 12 MONTHS, THE FOOD YOU BOUGHT JUST DIDN'T LAST AND YOU DIDN'T HAVE MONEY TO GET MORE.: NEVER TRUE

## 2021-06-10 SDOH — ECONOMIC STABILITY: FOOD INSECURITY: WITHIN THE PAST 12 MONTHS, YOU WORRIED THAT YOUR FOOD WOULD RUN OUT BEFORE YOU GOT MONEY TO BUY MORE.: NEVER TRUE

## 2021-06-10 SDOH — ECONOMIC STABILITY: HOUSING INSECURITY
IN THE LAST 12 MONTHS, WAS THERE A TIME WHEN YOU DID NOT HAVE A STEADY PLACE TO SLEEP OR SLEPT IN A SHELTER (INCLUDING NOW)?: NO

## 2021-06-10 SDOH — ECONOMIC STABILITY: INCOME INSECURITY: IN THE LAST 12 MONTHS, WAS THERE A TIME WHEN YOU WERE NOT ABLE TO PAY THE MORTGAGE OR RENT ON TIME?: NO

## 2021-06-10 SDOH — ECONOMIC STABILITY: TRANSPORTATION INSECURITY
IN THE PAST 12 MONTHS, HAS LACK OF TRANSPORTATION KEPT YOU FROM MEETINGS, WORK, OR FROM GETTING THINGS NEEDED FOR DAILY LIVING?: NO

## 2021-06-10 ASSESSMENT — SOCIAL DETERMINANTS OF HEALTH (SDOH): HOW HARD IS IT FOR YOU TO PAY FOR THE VERY BASICS LIKE FOOD, HOUSING, MEDICAL CARE, AND HEATING?: NOT HARD AT ALL

## 2021-06-10 ASSESSMENT — PATIENT HEALTH QUESTIONNAIRE - PHQ9
SUM OF ALL RESPONSES TO PHQ9 QUESTIONS 1 & 2: 0
SUM OF ALL RESPONSES TO PHQ QUESTIONS 1-9: 0
2. FEELING DOWN, DEPRESSED OR HOPELESS: 0
1. LITTLE INTEREST OR PLEASURE IN DOING THINGS: 0
SUM OF ALL RESPONSES TO PHQ QUESTIONS 1-9: 0
SUM OF ALL RESPONSES TO PHQ QUESTIONS 1-9: 0

## 2021-06-10 NOTE — ASSESSMENT & PLAN NOTE
Mild improvement with adjustable bed, but still having significant residual symptoms on bid PPI. Referral provided to GI- will likely require EGD.

## 2021-06-10 NOTE — ASSESSMENT & PLAN NOTE
Likely chronic trochanteric bursitis, exacerbated by working from home, better with more activity. Continue stretching and prn Zanaflex and chiropractic. If worsens, will refer back to hip ortho.

## 2021-06-10 NOTE — TELEPHONE ENCOUNTER
----- Message from Tiffanie Alcazar sent at 6/10/2021  1:55 PM EDT -----  Subject: Message to Provider    QUESTIONS  Information for Provider? pt needs an annual physical for her job around   December. She had a message to schedule her 6 month f/u for chronic   conditions in 6 months as well which she made for 12/2/2021. Wants to know   should she keep this appt or combine and do a physical at that time as   well. Please call/advise.   ---------------------------------------------------------------------------  --------------  CALL BACK INFO  What is the best way for the office to contact you? OK to leave message on   voicemail  Preferred Call Back Phone Number? [phone]  ---------------------------------------------------------------------------  --------------  SCRIPT ANSWERS  Relationship to Patient?  Self

## 2021-08-08 RX ORDER — TIZANIDINE HYDROCHLORIDE 4 MG/1
CAPSULE, GELATIN COATED ORAL
Qty: 90 CAPSULE | Refills: 1 | Status: SHIPPED | OUTPATIENT
Start: 2021-08-08 | End: 2021-11-11 | Stop reason: SDUPTHER

## 2021-11-10 NOTE — TELEPHONE ENCOUNTER
Last appointment: 6/10/2021  Next appointment: 12/2/2021  Last refill: Needs alternative to Tizanidine per insurance. Left message with patient to find out if she wants to switch or pay out of pocket.

## 2021-11-11 ENCOUNTER — TELEPHONE (OUTPATIENT)
Dept: ADMINISTRATIVE | Age: 51
End: 2021-11-11

## 2021-11-11 RX ORDER — TIZANIDINE HYDROCHLORIDE 4 MG/1
CAPSULE, GELATIN COATED ORAL
Qty: 90 CAPSULE | Refills: 1 | Status: SHIPPED | OUTPATIENT
Start: 2021-11-11 | End: 2022-02-01

## 2021-11-11 NOTE — TELEPHONE ENCOUNTER
Submitted PA for tiZANidine HCl 4MG capsules  Via CMM Key: [id number] STATUS:DENIED. Will scan denial letter once received. Please notify patient Thank you.

## 2022-01-30 NOTE — PROGRESS NOTES
2022    Migdalia Acevedo (: 1970) is a 54 y.o. female who presents for a preventive medicine evaluation. Patient Active Problem List   Diagnosis    Migraine headache    Bilateral hip pain    Neuropathic pain, arm    Lumbar spondylosis    Multiple thyroid nodules    Cervical spinal stenosis    Mixed hyperlipidemia    Recurrent cold sores    Status post bariatric surgery    GERD (gastroesophageal reflux disease)    Allergic conjunctivitis of both eyes       Review of Systems   Constitutional: Negative. HENT: Negative. Occasional cold sores- respond to Denavir   Eyes: Positive for itching. Respiratory: Negative. Cardiovascular: Negative. Gastrointestinal: Negative. Genitourinary: Negative. Musculoskeletal: Negative. Skin: Negative. Neurological: Negative. Psychiatric/Behavioral: Negative. Allergies   Allergen Reactions    Codeine Nausea And Vomiting     Severe vomiting     Prior to Visit Medications    Medication Sig Taking? Authorizing Provider   tiZANidine (ZANAFLEX) 4 MG capsule TAKE 1 CAPSULE BY MOUTH AT BEDTIME Yes Remigio Ascencio MD   penciclovir (DENAVIR) 1 % cream Apply topically every 2 hours. Yes Remigio Ascencio MD   omeprazole 20 MG EC tablet Take 20 mg by mouth 2 times daily  Yes Historical Provider, MD   Cyanocobalamin (VITAMIN B 12 PO) Take by mouth Yes Historical Provider, MD   vitamin D (CHOLECALCIFEROL) 1000 UNIT TABS tablet Take 1,000 Units by mouth daily. Yes Historical Provider, MD   Lactobacillus (ACIDOPHILUS PO) Take  by mouth. Yes Historical Provider, MD   Biotin 1000 MCG TABS Take 1 tablet by mouth daily. Yes Historical Provider, MD   ibuprofen (ADVIL;MOTRIN) 800 MG tablet Take 800 mg by mouth every 8 hours as needed. Yes Historical Provider, MD   Loratadine (CLARITIN) 10 MG CAPS Take 1 tablet by mouth daily. Yes Historical Provider, MD   Multiple Vitamin (STRESSTABS PO) Take 1 tablet by mouth daily.  Yes Historical Provider, MD   gabapentin (NEURONTIN) 600 MG tablet Take 0.5 tablets by mouth nightly for 180 days. Joey Daev MD   tiZANidine (ZANAFLEX) 4 MG capsule TAKE 1 CAPSULE BY MOUTH AT BEDTIME  Joey Dave MD        Past Medical History:   Diagnosis Date    Fibromyalgia     Hyperlipidemia     Migraine headache     PONV (postoperative nausea and vomiting)     severe    Sleep apnea     Snores     Uterine prolapse     Varicose veins      Past Surgical History:   Procedure Laterality Date    BARIATRIC SURGERY  06/29/2018    Gastric sleeve    BREAST REDUCTION SURGERY  2000    BREAST REDUCTION SURGERY  8/14    HERNIA REPAIR  1997    Right inguinal    HYSTERECTOMY, VAGINAL  12/07    TONSILLECTOMY      VENTRAL HERNIA REPAIR  9/18/2015    Open, with mesh; excision of anterior abdominal wall mass     Family History   Problem Relation Age of Onset    Breast Cancer Maternal Grandmother     Lung Cancer Maternal Grandmother     Arthritis Maternal Grandmother     Breast Cancer Paternal Grandmother     Lung Cancer Father 54        Smoker    Lung Cancer Maternal Grandfather     Diabetes Son         Type I    Arthritis Mother     Sleep Apnea Mother     Sleep Apnea Brother        Vitals:    02/01/22 1003   BP: 100/64   Pulse: 67   Resp: 16   SpO2: 98%   Weight: 158 lb (71.7 kg)   Height: 5' 6\" (1.676 m)      Estimated body mass index is 25.5 kg/m² as calculated from the following:    Height as of this encounter: 5' 6\" (1.676 m). Weight as of this encounter: 158 lb (71.7 kg). Wt Readings from Last 3 Encounters:   02/01/22 158 lb (71.7 kg)   12/10/20 154 lb (69.9 kg)   06/04/20 145 lb (65.8 kg)     BP Readings from Last 3 Encounters:   02/01/22 100/64   12/10/20 102/62   06/04/20 102/68     Physical Exam  Constitutional:       General: She is not in acute distress. Appearance: She is well-developed. HENT:      Head: Normocephalic and atraumatic.       Right Ear: Tympanic membrane, ear canal and external ear normal.      Left Ear: Tympanic membrane, ear canal and external ear normal.   Eyes:      General: Lids are normal.      Conjunctiva/sclera: Conjunctivae normal.      Pupils: Pupils are equal, round, and reactive to light. Neck:      Thyroid: No thyroid mass or thyromegaly. Vascular: No carotid bruit. Cardiovascular:      Rate and Rhythm: Normal rate and regular rhythm. Heart sounds: Normal heart sounds. No murmur heard. No friction rub. No gallop. Pulmonary:      Effort: Pulmonary effort is normal. No respiratory distress. Breath sounds: Normal breath sounds. No wheezing, rhonchi or rales. Abdominal:      General: Bowel sounds are normal. There is no distension. Palpations: Abdomen is soft. There is no mass. Tenderness: There is no abdominal tenderness. Musculoskeletal:         General: No tenderness. Normal range of motion. Cervical back: Neck supple. Lymphadenopathy:      Cervical: No cervical adenopathy. Skin:     General: Skin is warm and dry. Findings: No erythema or rash. Comments: No suspicious lesions. Neurological:      Mental Status: She is alert and oriented to person, place, and time. Coordination: Coordination normal.      Deep Tendon Reflexes: Reflexes are normal and symmetric. Psychiatric:         Speech: Speech normal.         Behavior: Behavior normal.         Thought Content:  Thought content normal.         Judgment: Judgment normal.       Lab Results   Component Value Date    CHOLFAST 214 (H) 03/24/2021    TRIGLYCFAST 66 03/24/2021    HDL 71 (H) 03/24/2021    LDLCALC 130 (H) 03/24/2021     Lab Results   Component Value Date    GLUF 54 (L) 03/24/2021    LABA1C 5.5 11/16/2017     Lab Results   Component Value Date     03/24/2021    K 4.0 03/24/2021    BUN 17 03/24/2021    CREATININE 0.6 03/24/2021    LABGLOM >60 03/24/2021    GFRAA >60 03/24/2021    CALCIUM 9.5 03/24/2021    VITD25 48 03/22/2011     Lab Results   Component Value Date    ALT 18 03/24/2021    AST 20 03/24/2021     Lab Results   Component Value Date    HGB 13.2 07/02/2018     Lab Results   Component Value Date    TSHREFLEX 0.94 03/24/2021    TSH 0.71 11/15/2016        Preventive Care:  Eye exam within the past 2 years? yes  Dental exam within the past year? yes  Seatbelt used consistently: yes  Working smoke and carbon monoxide detectors in home: yes   Avoiding any major food groups?  No  Exercising at least 150 minutes/week? no - nothing regular  Advance Directive: N <no information>- in progress    Social History     Tobacco Use    Smoking status: Never Smoker    Smokeless tobacco: Never Used   Substance Use Topics    Alcohol use: No     Social History     Substance and Sexual Activity   Sexual Activity Yes    Partners: Male    Birth control/protection: Surgical       Immunization History   Administered Date(s) Administered    COVID-19, Pfizer Purple top, DILUTE for use, 12+ yrs, 30mcg/0.3mL dose 03/29/2021, 04/19/2021, 01/08/2022    Influenza Virus Vaccine 10/16/2014, 10/19/2016, 11/05/2019, 11/12/2020    Influenza, Quadv, IM, PF (6 mo and older Fluzone, Flulaval, Fluarix, and 3 yrs and older Afluria) 11/16/2017, 09/11/2018    Td, unspecified formulation 11/02/2004    Tdap (Boostrix, Adacel) 11/15/2016     Health Maintenance   Topic Date Due    Colon cancer screen colonoscopy  Never done    Lipid screen  03/24/2022    Depression Screen  06/10/2022    Breast cancer screen  08/18/2022    Diabetes screen  03/24/2024    DTaP/Tdap/Td vaccine (2 - Td or Tdap) 11/15/2026    Flu vaccine  Completed    COVID-19 Vaccine  Completed    Hepatitis C screen  Completed    HIV screen  Completed    Hepatitis A vaccine  Aged Out    Hepatitis B vaccine  Aged Out    Hib vaccine  Aged Out    Meningococcal (ACWY) vaccine  Aged Out    Pneumococcal 0-64 years Vaccine  Aged Out       Assessment/Plan:  Annual physical exam  -     Lipid, Fasting; Future  -     Comprehensive Metabolic Panel, Fasting; Future  -     TSH with Reflex; Future  Screening for colon cancer  -     AFL - Saulo Knott MD, Gastroenterology, [facility] [place]  Multiple thyroid nodules  Assessment & Plan:  Asymptomatic. She will schedule follow up thyroid US. Orders:  -     US THYROID; Future  Mixed hyperlipidemia  Assessment & Plan:  Importance of continued lifestyle changes stressed to help prevent need for cholesterol medication in the future. Recurrent cold sores  Assessment & Plan:  Responds well to prn Denavir- refilled. Allergic conjunctivitis of both eyes  Assessment & Plan:  Has been using neomycin/polymixin eyedrops, which are likely exacerbating issue. She will stop this medication and try Systane or Optiv for 2 weeks. If no improvement, she will try Pataday or Zaditor. If symptoms persist, she will make appointment at [facility]. Return in about 6 months (around 8/1/2022) for 30 MIN F/U.    --Dedrick Dean MD on 2/1/2022 at 10:43 AM    An electronic signature was used to authenticate this note.

## 2022-02-01 ENCOUNTER — OFFICE VISIT (OUTPATIENT)
Dept: INTERNAL MEDICINE CLINIC | Age: 52
End: 2022-02-01
Payer: COMMERCIAL

## 2022-02-01 VITALS
BODY MASS INDEX: 25.39 KG/M2 | WEIGHT: 158 LBS | OXYGEN SATURATION: 98 % | HEART RATE: 67 BPM | SYSTOLIC BLOOD PRESSURE: 100 MMHG | DIASTOLIC BLOOD PRESSURE: 64 MMHG | HEIGHT: 66 IN | RESPIRATION RATE: 16 BRPM

## 2022-02-01 DIAGNOSIS — E04.2 MULTIPLE THYROID NODULES: ICD-10-CM

## 2022-02-01 DIAGNOSIS — Z00.00 ANNUAL PHYSICAL EXAM: Primary | ICD-10-CM

## 2022-02-01 DIAGNOSIS — B00.1 RECURRENT COLD SORES: ICD-10-CM

## 2022-02-01 DIAGNOSIS — Z12.11 SCREENING FOR COLON CANCER: ICD-10-CM

## 2022-02-01 DIAGNOSIS — H10.13 ALLERGIC CONJUNCTIVITIS OF BOTH EYES: ICD-10-CM

## 2022-02-01 DIAGNOSIS — E78.2 MIXED HYPERLIPIDEMIA: ICD-10-CM

## 2022-02-01 PROCEDURE — 99396 PREV VISIT EST AGE 40-64: CPT | Performed by: INTERNAL MEDICINE

## 2022-02-01 RX ORDER — PENCICLOVIR 10 MG/G
CREAM TOPICAL
Qty: 1.5 G | Refills: 2 | Status: SHIPPED | OUTPATIENT
Start: 2022-02-01 | End: 2022-02-05

## 2022-02-01 RX ORDER — TIZANIDINE HYDROCHLORIDE 4 MG/1
CAPSULE, GELATIN COATED ORAL
Qty: 90 CAPSULE | Refills: 1 | Status: SHIPPED | OUTPATIENT
Start: 2022-02-01 | End: 2022-02-07

## 2022-02-01 ASSESSMENT — ENCOUNTER SYMPTOMS
EYE ITCHING: 1
GASTROINTESTINAL NEGATIVE: 1
RESPIRATORY NEGATIVE: 1

## 2022-02-01 NOTE — ASSESSMENT & PLAN NOTE
Has been using neomycin/polymixin eyedrops, which are likely exacerbating issue. She will stop this medication and try Systane or Optiv for 2 weeks. If no improvement, she will try Pataday or Zaditor. If symptoms persist, she will make appointment at [facility].

## 2022-02-02 ENCOUNTER — TELEPHONE (OUTPATIENT)
Dept: ADMINISTRATIVE | Age: 52
End: 2022-02-02

## 2022-02-03 ENCOUNTER — TELEPHONE (OUTPATIENT)
Dept: ADMINISTRATIVE | Age: 52
End: 2022-02-03

## 2022-02-07 RX ORDER — TIZANIDINE 4 MG/1
4 TABLET ORAL NIGHTLY PRN
Qty: 90 TABLET | Refills: 1 | Status: SHIPPED | OUTPATIENT
Start: 2022-02-07 | End: 2022-08-11

## 2022-02-07 NOTE — TELEPHONE ENCOUNTER
Tizanidine caps are not covered under insurance. But they will cover Tablets if you would like to change to those.   Thank you

## 2022-02-07 NOTE — TELEPHONE ENCOUNTER
Received DENIAL for Tizanidine HCL 4MG capsule. Letter is attached. If this requires a response please respond to the pool. [address])    Please advise patient. Thank you.

## 2022-08-11 RX ORDER — TIZANIDINE 4 MG/1
4 TABLET ORAL NIGHTLY PRN
Qty: 90 TABLET | Refills: 1 | Status: SHIPPED | OUTPATIENT
Start: 2022-08-11

## 2022-08-11 NOTE — TELEPHONE ENCOUNTER
Last appointment: 2/1/2022  Next appointment: Visit date not found  Last refill: 2/7/22  Sent IM5 message to schedule due/overdue appointment.

## 2022-09-07 ENCOUNTER — PATIENT MESSAGE (OUTPATIENT)
Dept: INTERNAL MEDICINE CLINIC | Age: 52
End: 2022-09-07

## 2022-09-07 RX ORDER — GABAPENTIN 600 MG/1
300 TABLET ORAL NIGHTLY
Qty: 45 TABLET | Refills: 1 | OUTPATIENT
Start: 2022-09-07 | End: 2023-03-06

## 2022-09-07 RX ORDER — GABAPENTIN 600 MG/1
300 TABLET ORAL NIGHTLY
Qty: 45 TABLET | Refills: 0 | Status: SHIPPED | OUTPATIENT
Start: 2022-09-07 | End: 2022-12-06

## 2022-09-07 NOTE — TELEPHONE ENCOUNTER
Last appointment: 2/1/2022  Next appointment: Visit date not found  Last refill: 6/10/2021  Left message for patient to call and schedule due/overdue appointment.

## 2022-09-07 NOTE — TELEPHONE ENCOUNTER
Last appointment: 2/1/2022  Next appointment: due 8/2022  Last refill: 6/10/2021  Sent [email or web address] message to schedule due/overdue appointment.

## 2022-09-07 NOTE — TELEPHONE ENCOUNTER
From: Cole Lawson  To: Dr. Tana Rice  Sent: 9/7/2022 12:19 PM EDT  Subject: Gabapentin    Hi Dr. Margaret Brennan, I'm leaving for [facility] [place] tomorrow evening until Sunday, September 18th and am wondering if you can refill my Gabapentin today? I'm almost out. I split a 600 mg tablet in half, once daily. According to [facility], I can receive the best price if I fill it at [address] [facility] in [place] ([phone]).     Thank you,  Prasanna Fontana

## 2022-11-28 ENCOUNTER — PATIENT MESSAGE (OUTPATIENT)
Dept: INTERNAL MEDICINE CLINIC | Age: 52
End: 2022-11-28

## 2022-11-28 DIAGNOSIS — Z12.11 SCREENING FOR COLON CANCER: Primary | ICD-10-CM

## 2022-11-28 DIAGNOSIS — R10.13 EPIGASTRIC PAIN: ICD-10-CM

## 2022-11-28 NOTE — TELEPHONE ENCOUNTER
From: Kylie Tatum  To: Dr. Jennifer Kevin  Sent: 11/28/2022 2:28 PM EST  Subject: Colonoscopy / Lizbeth Damico, you had previously advised me that I need to have a preventive colonoscopy and referred me to [facility] [address] I never did this. However, I recently saw my gastric sleeve doc as I think I may have a stomach ulcer and they want me to have an endoscopy. So that I only have to go through the prep/procedure one-time, I'm hoping to have a combined colonoscopy / endoscopy service and I'd like to utilize a provider close to my home. I was told my PCP should request the preventive / diagnostic service.  Can you submit to:  Dr. Lucero Co  [phone]  Thank you,  Jere Morales

## 2022-12-12 RX ORDER — GABAPENTIN 600 MG/1
300 TABLET ORAL NIGHTLY
Qty: 45 TABLET | Refills: 0 | Status: SHIPPED | OUTPATIENT
Start: 2022-12-12 | End: 2023-03-12

## 2023-02-02 NOTE — PROGRESS NOTES
2/10/2023    Dinora Kc (: 1970) is a 50 y.o. female who presents for a preventive medicine evaluation. Assessment/Plan:  Annual physical exam  Colonoscopy scheduled   COVID booster recommended  She will check insurance coverage for Shingrix  Mixed hyperlipidemia  Assessment & Plan:  Importance of continued lifestyle changes stressed to help prevent need for cholesterol medication in the future. Orders:  -     Lipid, Fasting; Future  -     Comprehensive Metabolic Panel, Fasting; Future  Gastroesophageal reflux disease, unspecified whether esophagitis present  Assessment & Plan:  Significant residual symptoms since gastric sleeve surgery despite daily omeprazole and prn Carafate. Await results of upcoming EGD. Neuropathic pain, arm  Assessment & Plan:  Has needed full 600 mg tablet over the past month due to neuropathic symptoms now affecting both feet. Higher dose has been effective, but has noticed increased fatigue. Will increase B12 supplement if indicated by lab results. If paresthesias broaden, will refer to neurology for further evaluation. Multiple thyroid nodules  Assessment & Plan:  Asymptomatic, overdue for repeat thyroid US- ordered. Orders:  -     TSH with Reflex; Future  -     US THYROID; Future  Status post bariatric surgery  Assessment & Plan:  No recent labs per bariatrics- ordered. Currently taking MVI, vitamin D, vitamin B12 consistently. Will adjust doses based upon lab results. Orders:  -     CBC with Auto Differential; Future  -     Ferritin; Future  -     Iron and TIBC; Future  -     Vitamin B12; Future  -     Vitamin D 25 Hydroxy; Future  Current moderate episode of major depressive disorder without prior episode [facility])  Assessment & Plan:  Likely due to increased external stressors. Has tried therapy in the past without much benefit.  Will try starting Wellbutrin  qam. Patient will call for any significant medication side effects or worsening symptoms of depression. Return in about 2 weeks (around 2/24/2023) for 30 MIN F/U- Video. Patient Active Problem List   Diagnosis    Migraine headache    Bilateral hip pain    Neuropathic pain, arm    Lumbar spondylosis    Multiple thyroid nodules    Cervical spinal stenosis    Mixed hyperlipidemia    Recurrent cold sores    Status post bariatric surgery    GERD (gastroesophageal reflux disease)    Allergic conjunctivitis of both eyes    Current moderate episode of major depressive disorder without prior episode ([address]       Review of Systems   Constitutional:  Positive for fatigue (chronic). HENT:  Positive for sore throat (intermittent). Eyes: Negative. Respiratory: Negative. Cardiovascular: Negative. Gastrointestinal: Negative. Chronic heartburn since gastric sleeve 2018- EGD planned, takes omeprazole daily   Genitourinary: Negative. Musculoskeletal:  Positive for myalgias (chronic leg muscle aches). Skin: Negative. Neurological:  Positive for headaches (migraines and tension HA- 4/week). Psychiatric/Behavioral: Negative. See below      Mood Symptoms:  Patient complains of a 6 month(s) history of anhedonia, depressed mood, tearfulness, feelings of hopelessness, feelings of worthlessness/excessive guilt, insomnia, fatigue, decreased libido, difficulty concentrating, irritability, excessive worry, restlessness, panic attacks: infrequent, obsessive thoughts and compulsive behaviors: checks door locks excessively. She denies hypersomnia, changes in appetite/weight, increased use of drugs or alcohol, suicidal thoughts or behavior, and impaired memory. Symptoms/signs of evelin: none. Symptoms have been gradually worsening with time. The patient has no prior history of mental health diagnoses. Pertinent family history: no psychiatric illness. External stressors: family issues and employment concern. Current treatment includes: none. Previous treatment has included:  none. Allergies   Allergen Reactions    Codeine Nausea And Vomiting     Severe vomiting     Prior to Visit Medications    Medication Sig Taking? Authorizing Provider   tiZANidine (ZANAFLEX) 4 MG tablet TAKE 1 TABLET BY MOUTH NIGHTLY AS NEEDED (MUSCLE SPASMS) Yes Tc Juarez MD   gabapentin (NEURONTIN) 600 MG tablet Take 0.5 tablets by mouth nightly for 90 days. Yes Tc Juarez MD   omeprazole 20 MG EC tablet Take 20 mg by mouth 2 times daily  Yes Historical Provider, MD   Cyanocobalamin (VITAMIN B 12 PO) Take by mouth Yes Historical Provider, MD   vitamin D (CHOLECALCIFEROL) 1000 UNIT TABS tablet Take 1,000 Units by mouth daily. Yes Historical Provider, MD   Lactobacillus (ACIDOPHILUS PO) Take  by mouth. Yes Historical Provider, MD   Biotin 1000 MCG TABS Take 1 tablet by mouth daily. Yes Historical Provider, MD   ibuprofen (ADVIL;MOTRIN) 800 MG tablet Take 800 mg by mouth every 8 hours as needed. Yes Historical Provider, MD   loratadine (CLARITIN) 10 MG capsule Take 1 tablet by mouth daily. Yes Historical Provider, MD   Multiple Vitamin (STRESSTABS PO) Take 1 tablet by mouth daily.  Yes Historical Provider, MD   tiZANidine (ZANAFLEX) 4 MG capsule TAKE 1 CAPSULE BY MOUTH AT BEDTIME  Tc Juarez MD        Past Medical History:   Diagnosis Date    Fibromyalgia     Hyperlipidemia     Migraine headache     PONV (postoperative nausea and vomiting)     severe    Sleep apnea     Snores     Uterine prolapse     Varicose veins      Past Surgical History:   Procedure Laterality Date    BARIATRIC SURGERY  06/29/2018    Gastric sleeve    BREAST REDUCTION SURGERY  2000    BREAST REDUCTION SURGERY  8/14    HERNIA REPAIR  1997    Right inguinal    HYSTERECTOMY, VAGINAL  12/07    TONSILLECTOMY      VENTRAL HERNIA REPAIR  9/18/2015    Open, with mesh; excision of anterior abdominal wall mass     Family History   Problem Relation Age of Onset    Breast Cancer Maternal Grandmother     Lung Cancer Maternal Grandmother     Arthritis Maternal Grandmother     Breast Cancer Paternal Grandmother     Lung Cancer Father 54        Smoker    Lung Cancer Maternal Grandfather     Diabetes Son         Type I    Arthritis Mother     Sleep Apnea Mother     Sleep Apnea Brother        Vitals:    02/10/23 1419   BP: (!) 90/56   Site: Right Upper Arm   Position: Sitting   Cuff Size: Medium Adult   Pulse: 72   SpO2: 98%   Weight: 159 lb (72.1 kg)   Height: 5' 5\" (1.651 m)      Estimated body mass index is 26.46 kg/m² as calculated from the following:    Height as of this encounter: 5' 5\" (1.651 m). Weight as of this encounter: 159 lb (72.1 kg). Wt Readings from Last 3 Encounters:   02/10/23 159 lb (72.1 kg)   02/01/22 158 lb (71.7 kg)   12/10/20 154 lb (69.9 kg)     BP Readings from Last 3 Encounters:   02/10/23 (!) 90/56   02/01/22 100/64   12/10/20 102/62     Physical Exam  Constitutional:       General: She is not in acute distress. Appearance: She is well-developed. HENT:      Head: Normocephalic and atraumatic. Right Ear: Tympanic membrane, ear canal and external ear normal.      Left Ear: Tympanic membrane, ear canal and external ear normal.      Mouth/Throat:      Mouth: Mucous membranes are moist.      Pharynx: Oropharynx is clear. Eyes:      General: Lids are normal.      Conjunctiva/sclera: Conjunctivae normal.      Pupils: Pupils are equal, round, and reactive to light. Neck:      Thyroid: No thyroid mass or thyromegaly. Vascular: No carotid bruit. Cardiovascular:      Rate and Rhythm: Normal rate and regular rhythm. Pulses: Normal pulses. Heart sounds: Normal heart sounds. No murmur heard. No friction rub. No gallop. Pulmonary:      Effort: Pulmonary effort is normal. No respiratory distress. Breath sounds: Normal breath sounds. No wheezing, rhonchi or rales. Abdominal:      General: Bowel sounds are normal. There is no distension. Palpations: Abdomen is soft.  There is no mass. Tenderness: There is no abdominal tenderness. Musculoskeletal:         General: No tenderness. Normal range of motion. Cervical back: Neck supple. Right lower leg: No edema. Left lower leg: No edema. Lymphadenopathy:      Cervical: No cervical adenopathy. Skin:     General: Skin is warm and dry. Findings: No erythema or rash. Comments: No suspicious lesions. Neurological:      Mental Status: She is alert and oriented to person, place, and time. Coordination: Coordination normal.      Deep Tendon Reflexes: Reflexes are normal and symmetric. Psychiatric:         Mood and Affect: Mood normal.         Speech: Speech normal.         Behavior: Behavior normal.         Thought Content:  Thought content normal.         Cognition and Memory: Cognition normal.         Judgment: Judgment normal.       Lab Results   Component Value Date    CHOLFAST 214 (H) 02/01/2022    TRIGLYCFAST 93 02/01/2022    HDL 71 (H) 02/01/2022    LDLCALC 124 (H) 02/01/2022     Lab Results   Component Value Date    GLUF 82 02/01/2022    LABA1C 5.5 11/16/2017     Lab Results   Component Value Date     02/01/2022    K 4.4 02/01/2022    BUN 17 02/01/2022    CREATININE 0.6 02/01/2022    LABGLOM >60 02/01/2022    GFRAA >60 02/01/2022    CALCIUM 9.8 02/01/2022    VITD25 48 03/22/2011     Lab Results   Component Value Date    ALT 18 02/01/2022    AST 19 02/01/2022     Lab Results   Component Value Date    HGB 13.2 07/02/2018     Lab Results   Component Value Date    TSHREFLEX 0.60 02/01/2022    TSH 0.71 11/15/2016       Social History     Tobacco Use    Smoking status: Never    Smokeless tobacco: Never   Substance Use Topics    Alcohol use: No     Social History     Substance and Sexual Activity   Sexual Activity Yes    Partners: Male    Birth control/protection: Surgical       Immunization History   Administered Date(s) Administered    COVID-19, PFIZER PURPLE top, DILUTE for use, (age 15 y+), 30mcg/0.3mL 03/29/2021, 04/19/2021, 01/08/2022    Influenza Virus Vaccine 10/16/2014, 10/19/2016, 11/05/2019, 11/12/2020, 10/31/2022    Influenza, FLUARIX, FLULAVAL, FLUZONE (age 10 mo+) AND AFLURIA, (age 1 y+), PF, 0.5mL 11/16/2017, 09/11/2018    Td, unspecified formulation 11/02/2004    Tdap (Boostrix, Adacel) 11/15/2016     Health Maintenance   Topic Date Due    Colorectal Cancer Screen  Never done    COVID-19 Vaccine (4 - Booster for Pfizer series) 03/05/2022    Depression Monitoring  06/10/2022    Shingles vaccine (1 of 2) Never done    Lipids  02/01/2023    Breast cancer screen  08/30/2023    Diabetes screen  02/01/2025    DTaP/Tdap/Td vaccine (2 - Td or Tdap) 11/15/2026    Flu vaccine  Completed    Hepatitis C screen  Completed    HIV screen  Completed    Hepatitis A vaccine  Aged Out    Hib vaccine  Aged Out    Meningococcal (ACWY) vaccine  Aged Out    Pneumococcal 0-64 years Vaccine  Aged Out       --Julianne Matthew MD on 2/10/2023 at 3:05 PM    An electronic signature was used to authenticate this note.

## 2023-02-06 RX ORDER — TIZANIDINE 4 MG/1
4 TABLET ORAL NIGHTLY PRN
Qty: 30 TABLET | Refills: 5 | Status: SHIPPED | OUTPATIENT
Start: 2023-02-06

## 2023-02-10 ENCOUNTER — OFFICE VISIT (OUTPATIENT)
Dept: INTERNAL MEDICINE CLINIC | Age: 53
End: 2023-02-10

## 2023-02-10 VITALS
HEART RATE: 72 BPM | OXYGEN SATURATION: 98 % | WEIGHT: 159 LBS | DIASTOLIC BLOOD PRESSURE: 56 MMHG | BODY MASS INDEX: 26.49 KG/M2 | HEIGHT: 65 IN | SYSTOLIC BLOOD PRESSURE: 90 MMHG

## 2023-02-10 DIAGNOSIS — M79.2 NEUROPATHIC PAIN, ARM: ICD-10-CM

## 2023-02-10 DIAGNOSIS — Z98.84 STATUS POST BARIATRIC SURGERY: ICD-10-CM

## 2023-02-10 DIAGNOSIS — K21.9 GASTROESOPHAGEAL REFLUX DISEASE, UNSPECIFIED WHETHER ESOPHAGITIS PRESENT: ICD-10-CM

## 2023-02-10 DIAGNOSIS — F32.1 CURRENT MODERATE EPISODE OF MAJOR DEPRESSIVE DISORDER WITHOUT PRIOR EPISODE (HCC): ICD-10-CM

## 2023-02-10 DIAGNOSIS — E78.2 MIXED HYPERLIPIDEMIA: ICD-10-CM

## 2023-02-10 DIAGNOSIS — Z00.00 ANNUAL PHYSICAL EXAM: Primary | ICD-10-CM

## 2023-02-10 DIAGNOSIS — E04.2 MULTIPLE THYROID NODULES: ICD-10-CM

## 2023-02-10 RX ORDER — BUPROPION HYDROCHLORIDE 150 MG/1
150 TABLET ORAL EVERY MORNING
Qty: 30 TABLET | Refills: 3 | Status: SHIPPED | OUTPATIENT
Start: 2023-02-10

## 2023-02-10 RX ORDER — GABAPENTIN 600 MG/1
600 TABLET ORAL NIGHTLY
Qty: 90 TABLET | Refills: 2 | Status: SHIPPED | OUTPATIENT
Start: 2023-02-10 | End: 2023-11-07

## 2023-02-10 SDOH — ECONOMIC STABILITY: FOOD INSECURITY: WITHIN THE PAST 12 MONTHS, THE FOOD YOU BOUGHT JUST DIDN'T LAST AND YOU DIDN'T HAVE MONEY TO GET MORE.: NEVER TRUE

## 2023-02-10 SDOH — ECONOMIC STABILITY: FOOD INSECURITY: WITHIN THE PAST 12 MONTHS, YOU WORRIED THAT YOUR FOOD WOULD RUN OUT BEFORE YOU GOT MONEY TO BUY MORE.: NEVER TRUE

## 2023-02-10 SDOH — ECONOMIC STABILITY: INCOME INSECURITY: HOW HARD IS IT FOR YOU TO PAY FOR THE VERY BASICS LIKE FOOD, HOUSING, MEDICAL CARE, AND HEATING?: NOT HARD AT ALL

## 2023-02-10 ASSESSMENT — PATIENT HEALTH QUESTIONNAIRE - PHQ9
10. IF YOU CHECKED OFF ANY PROBLEMS, HOW DIFFICULT HAVE THESE PROBLEMS MADE IT FOR YOU TO DO YOUR WORK, TAKE CARE OF THINGS AT HOME, OR GET ALONG WITH OTHER PEOPLE: 0
8. MOVING OR SPEAKING SO SLOWLY THAT OTHER PEOPLE COULD HAVE NOTICED. OR THE OPPOSITE, BEING SO FIGETY OR RESTLESS THAT YOU HAVE BEEN MOVING AROUND A LOT MORE THAN USUAL: 0
1. LITTLE INTEREST OR PLEASURE IN DOING THINGS: 3
6. FEELING BAD ABOUT YOURSELF - OR THAT YOU ARE A FAILURE OR HAVE LET YOURSELF OR YOUR FAMILY DOWN: 3
3. TROUBLE FALLING OR STAYING ASLEEP: 3
SUM OF ALL RESPONSES TO PHQ QUESTIONS 1-9: 18
SUM OF ALL RESPONSES TO PHQ QUESTIONS 1-9: 18
7. TROUBLE CONCENTRATING ON THINGS, SUCH AS READING THE NEWSPAPER OR WATCHING TELEVISION: 3
SUM OF ALL RESPONSES TO PHQ QUESTIONS 1-9: 18
2. FEELING DOWN, DEPRESSED OR HOPELESS: 3
9. THOUGHTS THAT YOU WOULD BE BETTER OFF DEAD, OR OF HURTING YOURSELF: 0
4. FEELING TIRED OR HAVING LITTLE ENERGY: 3
SUM OF ALL RESPONSES TO PHQ QUESTIONS 1-9: 18
5. POOR APPETITE OR OVEREATING: 0
SUM OF ALL RESPONSES TO PHQ9 QUESTIONS 1 & 2: 6

## 2023-02-10 ASSESSMENT — ENCOUNTER SYMPTOMS
GASTROINTESTINAL NEGATIVE: 1
RESPIRATORY NEGATIVE: 1
EYES NEGATIVE: 1
SORE THROAT: 1

## 2023-02-10 NOTE — ASSESSMENT & PLAN NOTE
No recent labs per bariatrics- ordered. Currently taking MVI, vitamin D, vitamin B12 consistently. Will adjust doses based upon lab results.

## 2023-02-10 NOTE — ASSESSMENT & PLAN NOTE
Likely due to increased external stressors. Has tried therapy in the past without much benefit. Will try starting Wellbutrin  qam. Patient will call for any significant medication side effects or worsening symptoms of depression.

## 2023-02-10 NOTE — ASSESSMENT & PLAN NOTE
Has needed full 600 mg tablet over the past month due to neuropathic symptoms now affecting both feet. Higher dose has been effective, but has noticed increased fatigue. Will increase B12 supplement if indicated by lab results. If paresthesias broaden, will refer to neurology for further evaluation.

## 2023-02-10 NOTE — ASSESSMENT & PLAN NOTE
Significant residual symptoms since gastric sleeve surgery despite daily omeprazole and prn Carafate. Await results of upcoming EGD.

## 2023-03-06 PROBLEM — F32.9 MAJOR DEPRESSIVE DISORDER, SINGLE EPISODE, UNSPECIFIED: Status: ACTIVE | Noted: 2023-02-10

## 2023-03-06 RX ORDER — BUPROPION HYDROCHLORIDE 150 MG/1
TABLET ORAL
Qty: 30 TABLET | Refills: 3 | OUTPATIENT
Start: 2023-03-06

## 2023-03-06 NOTE — PROGRESS NOTES
Date of Visit:  3/9/2023    CC: Natan Knight (: 1970) is a 50 y.o. female, established patient, here for evaluation/re-evaluation of the following medical concerns:    ASSESSMENT/PLAN:  Major depressive disorder with single episode, in partial remission ([facility] [address]  Assessment & Plan:  Significant improvement on low dose Wellbutrin XL. She will continue for another 2 weeks at the 150 mg dose, then try increasing to 300 mg if progress stalls. Patient will call for any significant medication side effects or worsening symptoms of depression. Return in about 6 weeks (around 2023) for 30 MIN F/U- Video. Patient-Reported Vitals 3/9/2023   Patient-Reported Weight 158   Patient-Reported Height 5'5\"        Wt Readings from Last 3 Encounters:   02/10/23 159 lb (72.1 kg)   22 158 lb (71.7 kg)   12/10/20 154 lb (69.9 kg)     BP Readings from Last 3 Encounters:   02/10/23 (!) 90/56   22 100/64   12/10/20 102/62     Estimated body mass index is 26.46 kg/m² as calculated from the following:    Height as of 2/10/23: 5' 5\" (1.651 m). Weight as of 2/10/23: 159 lb (72.1 kg). HPI  Mood Disorder:  Patient presents for follow-up of depression. Current complaints include: anhedonia, depressed mood, tearfulness, feelings of hopelessness, feelings of worthlessness/excessive guilt, insomnia, fatigue, decreased libido, difficulty concentrating, irritability, excessive worry, restlessness, panic attacks: infrequent, obsessive thoughts and compulsive behaviors: checks door locks excessively- improved. She denies hypersomnia, changes in appetite/weight, increased use of drugs or alcohol, suicidal thoughts or behavior, and impaired memory. External stressors: family issues and employment concern. Current treatment includes: Wellbutrin- 150 mg qam.  Medication side effects: dry mouth. ROS  As documented above    Physical Exam  Constitutional:       General: She is not in acute distress.      Appearance: She is well-developed. HENT:      Head: Normocephalic and atraumatic. Mouth/Throat:      Lips: No lesions. Neck:      Comments: No visible mass  Pulmonary:      Effort: Pulmonary effort is normal. No respiratory distress. Skin:     Comments: No rash, erythema or other discoloration on facial skin and exposed areas of neck and upper extremites    Neurological:      Mental Status: She is alert. Comments: No facial asymmetry (cranial nerve 7 motor function) or gaze palsy   Psychiatric:         Attention and Perception: Attention normal.         Mood and Affect: Mood normal.         Speech: Speech normal.         Behavior: Behavior normal.         Thought Content: Thought content normal.         Cognition and Memory: Cognition normal.     On this date 3/9/2023 I have spent 20 minutes reviewing previous notes, test results and face to face with the patient discussing the diagnosis and importance of compliance with the treatment plan as well as documenting on the day of the visit. Poncho Dougherty was evaluated through a synchronous (real-time) audio-video encounter. The patient (or guardian if applicable) is aware that this is a billable service, which includes applicable co-pays. This Virtual Visit was conducted with patient's (and/or legal guardian's) consent. The visit was conducted pursuant to the emergency declaration under the [address] [place] [address] authority and the [employer] and [employer] General Act. Patient identification was verified, and a caregiver was present when appropriate. The patient was located at Home: [address] [place] [address]. Provider was located at Home ([facility]): Verónica Hager MD on 3/9/2023 at 10:52 AM    An electronic signature was used to authenticate this note.

## 2023-03-09 ENCOUNTER — TELEMEDICINE (OUTPATIENT)
Dept: INTERNAL MEDICINE CLINIC | Age: 53
End: 2023-03-09
Payer: COMMERCIAL

## 2023-03-09 DIAGNOSIS — F32.4 MAJOR DEPRESSIVE DISORDER WITH SINGLE EPISODE, IN PARTIAL REMISSION (HCC): ICD-10-CM

## 2023-03-09 PROCEDURE — 99213 OFFICE O/P EST LOW 20 MIN: CPT | Performed by: INTERNAL MEDICINE

## 2023-04-19 NOTE — PROGRESS NOTES
aware that this is a billable service, which includes applicable co-pays. This Virtual Visit was conducted with patient's (and/or legal guardian's) consent. The visit was conducted pursuant to the emergency declaration under the [address] [place] [address] authority and the [employer] and [employer] General Act. Patient identification was verified, and a caregiver was present when appropriate. The patient was located at Home: [address] [place] [address]. Provider was located at Home ([place] [address]): Lynne Salcedo MD on 4/20/2023 at 11:57 AM    An electronic signature was used to authenticate this note.

## 2023-04-20 ENCOUNTER — TELEMEDICINE (OUTPATIENT)
Dept: INTERNAL MEDICINE CLINIC | Age: 53
End: 2023-04-20
Payer: COMMERCIAL

## 2023-04-20 DIAGNOSIS — G43.911 INTRACTABLE MIGRAINE WITH STATUS MIGRAINOSUS, UNSPECIFIED MIGRAINE TYPE: Chronic | ICD-10-CM

## 2023-04-20 DIAGNOSIS — M48.02 CERVICAL SPINAL STENOSIS: ICD-10-CM

## 2023-04-20 DIAGNOSIS — F32.4 MAJOR DEPRESSIVE DISORDER WITH SINGLE EPISODE, IN PARTIAL REMISSION (HCC): Primary | ICD-10-CM

## 2023-04-20 PROCEDURE — 99214 OFFICE O/P EST MOD 30 MIN: CPT | Performed by: INTERNAL MEDICINE

## 2023-04-20 NOTE — ASSESSMENT & PLAN NOTE
May be contributing to her worsening headaches. Will address with neurologist. May warrant repeat MRI, since last imaging in 2016.

## 2023-04-20 NOTE — ASSESSMENT & PLAN NOTE
Improved. Unsure whether the Wellbutrin XL or the change in season/weather is responsible for this improvement. It is also unclear whether the Wellbutrin is contributing to her increase in headaches and dysequilibrium, so she will stop the medication for 2 weeks, then call or message with an update.

## 2023-04-20 NOTE — ASSESSMENT & PLAN NOTE
Worse- could be related to Wellbutrin, cervical spine issues or other more subtle triggers. If does not return to baseline with discontinuation of Wellbutrin, she will make appointment with headache specialist, Dr. Adan Rain. Since has been taking Advil Migraine on a daily basis, may have component of analgesic rebound, so she will try to limit this medication to 2x/week or less.

## 2023-05-23 LAB
CHOLESTEROL, TOTAL: 209 MG/DL
CHOLESTEROL/HDL RATIO: 3
GLUCOSE BLD-MCNC: 84 MG/DL
HDLC SERPL-MCNC: 70 MG/DL (ref 35–70)
LDL CHOLESTEROL CALCULATED: 127 MG/DL (ref 0–160)
NONHDLC SERPL-MCNC: NORMAL MG/DL
TRIGL SERPL-MCNC: 55 MG/DL
VLDLC SERPL CALC-MCNC: NORMAL MG/DL

## 2023-06-09 RX ORDER — BUPROPION HYDROCHLORIDE 150 MG/1
TABLET ORAL
Qty: 90 TABLET | Refills: 1 | Status: SHIPPED | OUTPATIENT
Start: 2023-06-09

## 2023-07-12 LAB
ALBUMIN SERPL-MCNC: 2.8 G/DL
ALBUMIN SERPL-MCNC: 4.1 G/DL
ALP BLD-CCNC: 4.1 U/L
ALP BLD-CCNC: 88 U/L
ALT SERPL-CCNC: 30 U/L
ALT SERPL-CCNC: 30 U/L
ANION GAP SERPL CALCULATED.3IONS-SCNC: 7.7 MMOL/L
ANION GAP SERPL CALCULATED.3IONS-SCNC: NORMAL MMOL/L
AST SERPL-CCNC: 19 U/L
AST SERPL-CCNC: 19 U/L
AVERAGE GLUCOSE: 88
AVERAGE GLUCOSE: NORMAL
BASOPHILS ABSOLUTE: ABNORMAL
BASOPHILS ABSOLUTE: ABNORMAL
BASOPHILS RELATIVE PERCENT: ABNORMAL
BASOPHILS RELATIVE PERCENT: ABNORMAL
BILIRUB SERPL-MCNC: 0.7 MG/DL (ref 0.1–1.4)
BILIRUB SERPL-MCNC: NORMAL MG/DL
BUN BLDV-MCNC: 18 MG/DL
BUN BLDV-MCNC: 18 MG/DL
CALCIUM SERPL-MCNC: 9.3 MG/DL
CALCIUM SERPL-MCNC: 9.3 MG/DL
CHLORIDE BLD-SCNC: 108 MMOL/L
CHLORIDE BLD-SCNC: 3.7 MMOL/L
CHOLESTEROL, TOTAL: 205 MG/DL
CHOLESTEROL, TOTAL: 205 MG/DL
CHOLESTEROL/HDL RATIO: ABNORMAL
CHOLESTEROL/HDL RATIO: ABNORMAL
CO2: 28 MMOL/L
CO2: 28 MMOL/L
CREAT SERPL-MCNC: 0.7 MG/DL
CREAT SERPL-MCNC: 0.7 MG/DL
EGFR: >60
EGFR: NORMAL
EOSINOPHILS ABSOLUTE: ABNORMAL
EOSINOPHILS ABSOLUTE: ABNORMAL
EOSINOPHILS RELATIVE PERCENT: ABNORMAL
EOSINOPHILS RELATIVE PERCENT: ABNORMAL
FOLATE: 18.8
GLUCOSE BLD-MCNC: 88 MG/DL
GLUCOSE BLD-MCNC: 88 MG/DL
HBA1C MFR BLD: 5.4 %
HBA1C MFR BLD: 5.4 %
HCT VFR BLD CALC: 35.8 % (ref 36–46)
HCT VFR BLD CALC: 35.8 % (ref 36–46)
HDLC SERPL-MCNC: 71 MG/DL (ref 35–70)
HDLC SERPL-MCNC: 71 MG/DL (ref 35–70)
HEMOGLOBIN: 11.7 G/DL (ref 12–16)
HEMOGLOBIN: 11.7 G/DL (ref 12–16)
IRON: 81
IRON: 81
LDL CHOLESTEROL CALCULATED: 121 MG/DL (ref 0–160)
LDL CHOLESTEROL CALCULATED: 121 MG/DL (ref 0–160)
LYMPHOCYTES ABSOLUTE: ABNORMAL
LYMPHOCYTES ABSOLUTE: ABNORMAL
LYMPHOCYTES RELATIVE PERCENT: ABNORMAL
LYMPHOCYTES RELATIVE PERCENT: ABNORMAL
MCH RBC QN AUTO: 30.5 PG
MCH RBC QN AUTO: 30.5 PG
MCHC RBC AUTO-ENTMCNC: 32.7 G/DL
MCHC RBC AUTO-ENTMCNC: 32.7 G/DL
MCV RBC AUTO: 93.2 FL
MCV RBC AUTO: 93.2 FL
MONOCYTES ABSOLUTE: ABNORMAL
MONOCYTES ABSOLUTE: ABNORMAL
MONOCYTES RELATIVE PERCENT: ABNORMAL
MONOCYTES RELATIVE PERCENT: ABNORMAL
NEUTROPHILS ABSOLUTE: ABNORMAL
NEUTROPHILS ABSOLUTE: ABNORMAL
NEUTROPHILS RELATIVE PERCENT: ABNORMAL
NEUTROPHILS RELATIVE PERCENT: ABNORMAL
NONHDLC SERPL-MCNC: ABNORMAL MG/DL
NONHDLC SERPL-MCNC: ABNORMAL MG/DL
PLATELET # BLD: 163 K/ΜL
PLATELET # BLD: 163 K/ΜL
PMV BLD AUTO: ABNORMAL FL
PMV BLD AUTO: ABNORMAL FL
POTASSIUM SERPL-SCNC: 108 MMOL/L
POTASSIUM SERPL-SCNC: 3.7 MMOL/L
RBC # BLD: 3.84 10^6/ΜL
RBC # BLD: 3.84 10^6/ΜL
SODIUM BLD-SCNC: 140 MMOL/L
SODIUM BLD-SCNC: 140 MMOL/L
TOTAL IRON BINDING CAPACITY: 303
TOTAL IRON BINDING CAPACITY: 303
TOTAL PROTEIN: 6.9
TOTAL PROTEIN: 6.9
TRIGL SERPL-MCNC: 63 MG/DL
TRIGL SERPL-MCNC: 63 MG/DL
TSH SERPL DL<=0.05 MIU/L-ACNC: 1.04 UIU/ML
VITAMIN B-12: 574
VLDLC SERPL CALC-MCNC: ABNORMAL MG/DL
VLDLC SERPL CALC-MCNC: ABNORMAL MG/DL
WBC # BLD: 4.8 10^3/ML
WBC # BLD: 4.8 10^3/ML

## 2023-08-02 RX ORDER — TIZANIDINE 4 MG/1
TABLET ORAL
Qty: 90 TABLET | Refills: 1 | Status: SHIPPED | OUTPATIENT
Start: 2023-08-02

## 2023-08-14 PROBLEM — H10.13 ALLERGIC CONJUNCTIVITIS OF BOTH EYES: Status: RESOLVED | Noted: 2022-02-01 | Resolved: 2023-08-14

## 2023-08-14 PROBLEM — D64.9 ANEMIA: Status: ACTIVE | Noted: 2023-08-14

## 2023-08-14 NOTE — PROGRESS NOTES
Date of Visit:  2023    CC: Gissell Rivera (: 1970) is a 48 y.o. female, established patient, here for evaluation/re-evaluation of the following medical concerns:    ASSESSMENT/PLAN:  Major depressive disorder with single episode, in partial remission ([address])  Assessment & Plan:  All symptoms significantly improved despite discontinuation of Wellbutrin XL, but she is concerned that her symptoms will worsen in the late fall, so she plans to restart once the time changes. Not interested in counseling, since has not been helpful in the past. Patient will call for any significant medication side effects or worsening symptoms of depression. Migraine without status migrainosus, not intractable, unspecified migraine type  Assessment & Plan:  Migraines improved with cosmetic Botox, but starting to worsen as this wears off. Since suspect main trigger is her cervical spine issues and optimal Botox placement for HAs is different than for cosmetic purposes, she will make appointment with [facility] specialist, Dr. Sherwin Renee. Multiple thyroid nodules  Assessment & Plan:  Recent TSH normal, but repeat thyroid US overdue. She was reminded to schedule. Anemia, unspecified type  Assessment & Plan:  Mild per recent labs. Malabsorption secondary to bariatric surgery vs GI blood loss, possibly triggered by chronic NSAID use. Recently started on MVI with iron per bariatric surgeon. Recheck CBC with iron studies in 6 months. Orders:  -     CBC with Auto Differential; Future  -     Ferritin; Future  -     Iron and TIBC; Future  -     Soluble transferrin receptor; Future  Arthralgia of multiple joints  Assessment & Plan:  Has long-standing diffuse arthralgias and myalgias, mostly confined to hips and legs. Autoimmune connective tissue disease labs negative, but noted significant improvement when took diclofenac 75 mg qd for about 6 weeks.  Since GERD worsened during same time frame despite bid omeprazole and at increased

## 2023-08-14 NOTE — ASSESSMENT & PLAN NOTE
Mild per recent labs. Malabsorption secondary to bariatric surgery vs GI blood loss, possibly triggered by chronic NSAID use. Recently started on MVI with iron per bariatric surgeon. Recheck CBC with iron studies in 6 months.

## 2023-08-17 ENCOUNTER — TELEMEDICINE (OUTPATIENT)
Dept: INTERNAL MEDICINE CLINIC | Age: 53
End: 2023-08-17
Payer: COMMERCIAL

## 2023-08-17 DIAGNOSIS — K21.9 GASTROESOPHAGEAL REFLUX DISEASE, UNSPECIFIED WHETHER ESOPHAGITIS PRESENT: ICD-10-CM

## 2023-08-17 DIAGNOSIS — F32.4 MAJOR DEPRESSIVE DISORDER WITH SINGLE EPISODE, IN PARTIAL REMISSION (HCC): Primary | ICD-10-CM

## 2023-08-17 DIAGNOSIS — M25.50 ARTHRALGIA OF MULTIPLE JOINTS: ICD-10-CM

## 2023-08-17 DIAGNOSIS — D64.9 ANEMIA, UNSPECIFIED TYPE: ICD-10-CM

## 2023-08-17 DIAGNOSIS — M79.2 NEUROPATHIC PAIN, ARM: ICD-10-CM

## 2023-08-17 DIAGNOSIS — E04.2 MULTIPLE THYROID NODULES: ICD-10-CM

## 2023-08-17 DIAGNOSIS — G43.909 MIGRAINE WITHOUT STATUS MIGRAINOSUS, NOT INTRACTABLE, UNSPECIFIED MIGRAINE TYPE: Chronic | ICD-10-CM

## 2023-08-17 PROCEDURE — 99215 OFFICE O/P EST HI 40 MIN: CPT | Performed by: INTERNAL MEDICINE

## 2023-08-17 NOTE — ASSESSMENT & PLAN NOTE
Worse despite bid omeprazole, likely due to recent NSAID use, which she will discontinue. If no improvement within 2 weeks, she will follow up with her GI specialist in [facility]. Recent EGD report requested.

## 2023-08-17 NOTE — ASSESSMENT & PLAN NOTE
Strongly suspect related to cervical spinal stenosis, but neuropathic arm pain adequately controlled on nightly gabapentin- continue.

## 2023-08-17 NOTE — ASSESSMENT & PLAN NOTE
Migraines improved with cosmetic Botox, but starting to worsen as this wears off. Since suspect main trigger is her cervical spine issues and optimal Botox placement for HAs is different than for cosmetic purposes, she will make appointment with [facility] specialist, Dr. Joe Raza.

## 2023-08-17 NOTE — ASSESSMENT & PLAN NOTE
Has long-standing diffuse arthralgias and myalgias, mostly confined to hips and legs. Autoimmune connective tissue disease labs negative, but noted significant improvement when took diclofenac 75 mg qd for about 6 weeks. Since GERD worsened during same time frame despite bid omeprazole and at increased risk for GI bleeding due to bariatric surgery, recommended discontinuation of all NSAIDs. However, since cannot rule out seronegative inflammatory arthritis, will refer to rheumatology for further evaluation.

## 2023-08-17 NOTE — ASSESSMENT & PLAN NOTE
All symptoms significantly improved despite discontinuation of Wellbutrin XL, but she is concerned that her symptoms will worsen in the late fall, so she plans to restart once the time changes. Not interested in counseling, since has not been helpful in the past. Patient will call for any significant medication side effects or worsening symptoms of depression.

## 2023-10-23 RX ORDER — GABAPENTIN 600 MG/1
600 TABLET ORAL NIGHTLY
Qty: 90 TABLET | Refills: 1 | Status: SHIPPED | OUTPATIENT
Start: 2023-10-23 | End: 2024-04-20

## 2023-11-27 ENCOUNTER — PATIENT MESSAGE (OUTPATIENT)
Dept: INTERNAL MEDICINE CLINIC | Age: 53
End: 2023-11-27

## 2023-11-27 NOTE — TELEPHONE ENCOUNTER
Reviewed E visit answers    Looks like she was kicked out due to stating she is coughing constantly.      Please advise      ( is also positive)

## 2023-11-27 NOTE — TELEPHONE ENCOUNTER
From: Stefanie Goodpasture  To: Dr. Flakito Sofia  Sent: 11/27/2023 3:59 PM EST  Subject: Akshat Rowe, I am quite sick with Covid (per my at home test it is Covid). I have been sick since Sunday. Do you recommend Paxlovid or to let it run its course?

## 2023-11-28 ENCOUNTER — TELEMEDICINE (OUTPATIENT)
Dept: PRIMARY CARE CLINIC | Age: 53
End: 2023-11-28
Payer: COMMERCIAL

## 2023-11-28 DIAGNOSIS — R51.9 HEADACHE DUE TO VIRAL INFECTION: ICD-10-CM

## 2023-11-28 DIAGNOSIS — R09.81 NASAL CONGESTION: ICD-10-CM

## 2023-11-28 DIAGNOSIS — R52 BODY ACHES: ICD-10-CM

## 2023-11-28 DIAGNOSIS — R05.1 ACUTE COUGH: Primary | ICD-10-CM

## 2023-11-28 DIAGNOSIS — B34.9 HEADACHE DUE TO VIRAL INFECTION: ICD-10-CM

## 2023-11-28 DIAGNOSIS — U07.1 COVID-19: ICD-10-CM

## 2023-11-28 PROCEDURE — 99213 OFFICE O/P EST LOW 20 MIN: CPT | Performed by: NURSE PRACTITIONER

## 2023-11-28 RX ORDER — DEXTROMETHORPHAN HYDROBROMIDE AND PROMETHAZINE HYDROCHLORIDE 15; 6.25 MG/5ML; MG/5ML
5 SYRUP ORAL 4 TIMES DAILY PRN
Qty: 120 ML | Refills: 0 | Status: SHIPPED | OUTPATIENT
Start: 2023-11-28

## 2023-11-28 RX ORDER — FLUTICASONE PROPIONATE 50 MCG
2 SPRAY, SUSPENSION (ML) NASAL DAILY
Qty: 16 G | Refills: 0 | Status: SHIPPED | OUTPATIENT
Start: 2023-11-28

## 2023-11-28 RX ORDER — BENZONATATE 100 MG/1
100 CAPSULE ORAL 3 TIMES DAILY PRN
Qty: 30 CAPSULE | Refills: 0 | Status: SHIPPED | OUTPATIENT
Start: 2023-11-28 | End: 2023-12-08

## 2023-11-28 ASSESSMENT — ENCOUNTER SYMPTOMS
SHORTNESS OF BREATH: 0
COUGH: 1
WHEEZING: 0

## 2023-11-28 NOTE — PROGRESS NOTES
Mikal Elizabeth, was evaluated through a synchronous (real-time) audio-video encounter. The patient (or guardian if applicable) is aware that this is a billable service, which includes applicable co-pays. This Virtual Visit was conducted with patient's (and/or legal guardian's) consent. Patient identification was verified, and a caregiver was present when appropriate. The patient was located at Home: [address] [place]  Provider was located at Home ([facility]): Jovana Arauz (:  1970) is a Established patient, presenting virtually for evaluation of the following:  Nasal congestion, nonproductive cough, body aches and headaches since  tested positive for [address]   Below is the assessment and plan developed based on review of pertinent history, physical exam, labs, studies, and medications. 1. Acute cough  Problem  -     benzonatate (TESSALON) 100 MG capsule; Take 1 capsule by mouth 3 times daily as needed for Cough, Disp-30 capsule, R-0Normal  -     promethazine-dextromethorphan (PROMETHAZINE-DM) 6.25-15 MG/5ML syrup; Take 5 mLs by mouth 4 times daily as needed for Cough, Disp-120 mL, R-0Normal  She was instructed to alternate between the above 2 medications approximately 4 to 5 hours from each other. She verbalized understanding of this  Do not take above medications together  See patient instructions    2. Nasal congestion  Problem  -     fluticasone (FLONASE) 50 MCG/ACT nasal spray; 2 sprays by Each Nostril route daily, Disp-16 g, R-0Normal    3. Body aches  Problem  Continue ibuprofen over-the-counter as the bottle directs May also alternate with Tylenol depending on how severe the body aches are  Push fluids    4. Headache due to viral infection  Problem  May alternate between ibuprofen and Tylenol over-the-counter as the bottles direct  She verbalizes understanding of the  This see patient instructions    5.

## 2023-12-11 NOTE — PROGRESS NOTES
Date of Visit:  2023    Marisol Breen, was evaluated through a synchronous (real-time) audio-video encounter. The patient (or guardian if applicable) is aware that this is a billable service, which includes applicable co-pays. This Virtual Visit was conducted with patient's (and/or legal guardian's) consent. Patient identification was verified, and a caregiver was present when appropriate. The patient was located at Home: [address]  Provider was located at Home ([address] [place] [address]): [place]        CC: Marisol Breen (: 1970) is a 48 y.o. female, established patient, here for evaluation/re-evaluation of the following medical concerns:    ASSESSMENT/PLAN:  Major depressive disorder with single episode, in partial remission ([address])  Assessment & Plan:  All symptoms significantly improved despite discontinuation of Wellbutrin XL, but she is concerned that her symptoms will worsen after the holidays- she will call if wants to restart medication. Not interested in counseling, since has not been helpful in the past.   Migraine without status migrainosus, not intractable, unspecified migraine type  Assessment & Plan:  Worse now that cosmetic Botox has worn off. May have element of analgesic rebound since taking Advil Migraine > 2x/week. She was urged to schedule appointment with neurologist to explore preventive options and to optimize treatment of acute episodes. Patient will call if symptoms change or worsen. Neuropathic pain, arm  Assessment & Plan:  Strongly suspect related to cervical spinal stenosis, but neuropathic arm pain adequately controlled on nightly gabapentin- continue. Multiple thyroid nodules  Assessment & Plan:  Last TSH normal, but repeat thyroid US overdue. She was reminded to schedule.    Gastroesophageal reflux disease, unspecified whether esophagitis present  Assessment & Plan:  Generally well-controlled on daily omeprazole at 20 mg qd, but still has rebound

## 2023-12-14 ENCOUNTER — TELEMEDICINE (OUTPATIENT)
Dept: INTERNAL MEDICINE CLINIC | Age: 53
End: 2023-12-14
Payer: COMMERCIAL

## 2023-12-14 DIAGNOSIS — K21.9 GASTROESOPHAGEAL REFLUX DISEASE, UNSPECIFIED WHETHER ESOPHAGITIS PRESENT: ICD-10-CM

## 2023-12-14 DIAGNOSIS — M79.2 NEUROPATHIC PAIN, ARM: ICD-10-CM

## 2023-12-14 DIAGNOSIS — D64.9 ANEMIA, UNSPECIFIED TYPE: ICD-10-CM

## 2023-12-14 DIAGNOSIS — F32.4 MAJOR DEPRESSIVE DISORDER WITH SINGLE EPISODE, IN PARTIAL REMISSION (HCC): Primary | ICD-10-CM

## 2023-12-14 DIAGNOSIS — G43.909 MIGRAINE WITHOUT STATUS MIGRAINOSUS, NOT INTRACTABLE, UNSPECIFIED MIGRAINE TYPE: Chronic | ICD-10-CM

## 2023-12-14 DIAGNOSIS — E04.2 MULTIPLE THYROID NODULES: ICD-10-CM

## 2023-12-14 PROCEDURE — 99214 OFFICE O/P EST MOD 30 MIN: CPT | Performed by: INTERNAL MEDICINE

## 2023-12-14 NOTE — ASSESSMENT & PLAN NOTE
All symptoms significantly improved despite discontinuation of Wellbutrin XL, but she is concerned that her symptoms will worsen after the holidays- she will call if wants to restart medication.  Not interested in counseling, since has not been helpful in the past.

## 2023-12-14 NOTE — ASSESSMENT & PLAN NOTE
Generally well-controlled on daily omeprazole at 20 mg qd, but still has rebound symptoms when takes Advil Migraine- she will explore other therapeutic options with neurologist. Repeat CBC and iron studies ordered to assess stability on daily iron supplement.

## 2023-12-14 NOTE — ASSESSMENT & PLAN NOTE
Worse now that cosmetic Botox has worn off. May have element of analgesic rebound since taking Advil Migraine > 2x/week. She was urged to schedule appointment with neurologist to explore preventive options and to optimize treatment of acute episodes. Patient will call if symptoms change or worsen.

## 2023-12-24 DIAGNOSIS — R09.81 NASAL CONGESTION: ICD-10-CM

## 2023-12-26 RX ORDER — FLUTICASONE PROPIONATE 50 MCG
2 SPRAY, SUSPENSION (ML) NASAL DAILY
Qty: 3 EACH | Refills: 3 | Status: SHIPPED | OUTPATIENT
Start: 2023-12-26

## 2023-12-27 ENCOUNTER — HOSPITAL ENCOUNTER (OUTPATIENT)
Dept: ULTRASOUND IMAGING | Age: 53
Discharge: HOME | End: 2023-12-27
Attending: INTERNAL MEDICINE
Payer: COMMERCIAL

## 2023-12-27 ENCOUNTER — TELEPHONE (OUTPATIENT)
Dept: INTERNAL MEDICINE CLINIC | Age: 53
End: 2023-12-27

## 2023-12-27 DIAGNOSIS — E04.2 MULTIPLE THYROID NODULES: ICD-10-CM

## 2023-12-27 PROCEDURE — 76536 US EXAM OF HEAD AND NECK: CPT

## 2023-12-27 NOTE — TELEPHONE ENCOUNTER
Patient is calling for  in regards to thyroid imaging results. Per 's note:    Thyroid US showed 1 suspicious-appearing nodule in the left lobe- recommend fine needle biopsy per ENT. If she does not have an ENT already, I can make a referral. Please call patient to notify.     Per patient she dosnt have an ENT doctor and would like a referral but she will call her insurance to pick an in network ENT and call back to let us know where to send referral to .

## 2024-01-05 ENCOUNTER — TELEPHONE (OUTPATIENT)
Dept: INTERNAL MEDICINE CLINIC | Age: 54
End: 2024-01-05

## 2024-01-05 NOTE — TELEPHONE ENCOUNTER
----- Message from Doron Lund sent at 1/5/2024 10:35 AM EST -----  Subject: Message to Provider    QUESTIONS  Information for Provider? Patient is wanting a disk of her Thyroid ultra   sound that was taken on 12/27 .  ---------------------------------------------------------------------------  --------------  CALL BACK INFO  [phone]; OK to leave message on voicemail  ---------------------------------------------------------------------------  --------------  SCRIPT ANSWERS  Relationship to Patient? Self

## 2024-01-17 RX ORDER — TIZANIDINE 4 MG/1
TABLET ORAL
Qty: 90 TABLET | Refills: 1 | Status: SHIPPED | OUTPATIENT
Start: 2024-01-17

## 2024-01-17 NOTE — TELEPHONE ENCOUNTER
Last appointment: 12/14/2023  Next appointment: Visit date not found (Due 6/14/2024)  Last refill: 8/2/23